# Patient Record
Sex: FEMALE | Race: WHITE | Employment: OTHER | URBAN - METROPOLITAN AREA
[De-identification: names, ages, dates, MRNs, and addresses within clinical notes are randomized per-mention and may not be internally consistent; named-entity substitution may affect disease eponyms.]

---

## 2017-03-22 DIAGNOSIS — E78.5 HYPERLIPIDEMIA, UNSPECIFIED HYPERLIPIDEMIA TYPE: ICD-10-CM

## 2017-03-22 LAB
CHOLESTEROL, TOTAL: NORMAL MG/DL
CHOLESTEROL/HDL RATIO: NORMAL
HDLC SERPL-MCNC: NORMAL MG/DL (ref 35–70)
LDL CHOLESTEROL CALCULATED: 90 MG/DL (ref 0–160)
TRIGL SERPL-MCNC: NORMAL MG/DL
VLDLC SERPL CALC-MCNC: NORMAL MG/DL

## 2017-05-25 ENCOUNTER — OFFICE VISIT (OUTPATIENT)
Dept: FAMILY MEDICINE CLINIC | Age: 72
End: 2017-05-25

## 2017-05-25 VITALS
DIASTOLIC BLOOD PRESSURE: 78 MMHG | HEIGHT: 66 IN | HEART RATE: 76 BPM | WEIGHT: 156 LBS | RESPIRATION RATE: 14 BRPM | SYSTOLIC BLOOD PRESSURE: 132 MMHG | BODY MASS INDEX: 25.07 KG/M2

## 2017-05-25 DIAGNOSIS — E78.5 HYPERLIPIDEMIA, UNSPECIFIED HYPERLIPIDEMIA TYPE: ICD-10-CM

## 2017-05-25 DIAGNOSIS — J30.2 SEASONAL ALLERGIC RHINITIS, UNSPECIFIED ALLERGIC RHINITIS TRIGGER: Primary | ICD-10-CM

## 2017-05-25 DIAGNOSIS — M25.542 ARTHRALGIA OF LEFT HAND: ICD-10-CM

## 2017-05-25 PROCEDURE — 99214 OFFICE O/P EST MOD 30 MIN: CPT | Performed by: EMERGENCY MEDICINE

## 2017-05-25 RX ORDER — ATORVASTATIN CALCIUM 20 MG/1
20 TABLET, FILM COATED ORAL DAILY
Qty: 90 TABLET | Refills: 3 | Status: SHIPPED | OUTPATIENT
Start: 2017-05-25 | End: 2018-09-19 | Stop reason: SDUPTHER

## 2017-05-25 ASSESSMENT — PATIENT HEALTH QUESTIONNAIRE - PHQ9
SUM OF ALL RESPONSES TO PHQ9 QUESTIONS 1 & 2: 0
SUM OF ALL RESPONSES TO PHQ9 QUESTIONS 1 & 2: 0
1. LITTLE INTEREST OR PLEASURE IN DOING THINGS: 0
SUM OF ALL RESPONSES TO PHQ QUESTIONS 1-9: 0
SUM OF ALL RESPONSES TO PHQ QUESTIONS 1-9: 0
2. FEELING DOWN, DEPRESSED OR HOPELESS: 0
1. LITTLE INTEREST OR PLEASURE IN DOING THINGS: 0
2. FEELING DOWN, DEPRESSED OR HOPELESS: 0

## 2017-05-25 ASSESSMENT — ENCOUNTER SYMPTOMS
WHEEZING: 0
RHINORRHEA: 1
ABDOMINAL PAIN: 0
BACK PAIN: 0
CHEST TIGHTNESS: 0
VOICE CHANGE: 0
DIARRHEA: 0
TROUBLE SWALLOWING: 0
VOMITING: 0
NAUSEA: 0
SHORTNESS OF BREATH: 0
SORE THROAT: 0
SINUS PRESSURE: 0
CONSTIPATION: 0
COUGH: 1

## 2017-11-28 ENCOUNTER — OFFICE VISIT (OUTPATIENT)
Dept: FAMILY MEDICINE CLINIC | Age: 72
End: 2017-11-28

## 2017-11-28 VITALS
DIASTOLIC BLOOD PRESSURE: 72 MMHG | HEART RATE: 66 BPM | HEIGHT: 66 IN | WEIGHT: 158.2 LBS | SYSTOLIC BLOOD PRESSURE: 134 MMHG | RESPIRATION RATE: 12 BRPM | BODY MASS INDEX: 25.43 KG/M2

## 2017-11-28 DIAGNOSIS — H02.539 LID LAG: ICD-10-CM

## 2017-11-28 DIAGNOSIS — M15.9 PRIMARY OSTEOARTHRITIS INVOLVING MULTIPLE JOINTS: ICD-10-CM

## 2017-11-28 DIAGNOSIS — E78.5 HYPERLIPIDEMIA, UNSPECIFIED HYPERLIPIDEMIA TYPE: Primary | ICD-10-CM

## 2017-11-28 PROCEDURE — 99213 OFFICE O/P EST LOW 20 MIN: CPT | Performed by: EMERGENCY MEDICINE

## 2017-11-28 ASSESSMENT — ENCOUNTER SYMPTOMS
SINUS PRESSURE: 0
CONSTIPATION: 0
TROUBLE SWALLOWING: 0
BACK PAIN: 0
DIARRHEA: 0
CHEST TIGHTNESS: 0
VOMITING: 0
RHINORRHEA: 0
VOICE CHANGE: 0
SORE THROAT: 0
NAUSEA: 0
SHORTNESS OF BREATH: 0
WHEEZING: 0
COUGH: 0
ABDOMINAL PAIN: 0

## 2017-11-28 NOTE — PROGRESS NOTES
Visit Date: 11/30/2017    Subjective:    Natan Veras is a 67 y.o. female who presents today for:  Chief Complaint   Patient presents with    Hyperlipidemia         HPI:     Left wrist pain, ankle, knees, finger and shoulder pain, Take Advil during the day and Aleve at night. Takes 3 Advil BID    Voltaren did not help much      Hyperlipidemia   This is a recurrent problem. The problem is controlled. Recent lipid tests were reviewed and are normal. She has no history of chronic renal disease, diabetes, hypothyroidism, liver disease, obesity or nephrotic syndrome. Pertinent negatives include no chest pain, leg pain, myalgias or shortness of breath. Current antihyperlipidemic treatment includes statins. There are no compliance problems. Current Home Medications:  Current Outpatient Prescriptions   Medication Sig Dispense Refill    Glucosamine-Chondroitin (GLUCOSAMINE CHONDR COMPLEX PO) Take by mouth      atorvastatin (LIPITOR) 20 MG tablet Take 1 tablet by mouth daily 90 tablet 3    ibuprofen (ADVIL;MOTRIN) 200 MG tablet Take 200 mg by mouth every morning      Multiple Vitamins-Minerals (THERAPEUTIC MULTIVITAMIN-MINERALS) tablet Take 1 tablet by mouth daily      cetirizine (ZYRTEC) 10 MG tablet Take 10 mg by mouth daily      naproxen sodium (ALEVE) 220 MG tablet Take 1 tablet by mouth 2 times daily (with meals) 60 tablet 3     No current facility-administered medications for this visit. Subjective:      Review of Systems   Constitutional: Negative for appetite change, chills, diaphoresis, fatigue and fever. HENT: Negative for congestion, ear pain, postnasal drip, rhinorrhea, sinus pressure, sneezing, sore throat, trouble swallowing and voice change. Respiratory: Negative for cough, chest tightness, shortness of breath and wheezing. Cardiovascular: Negative for chest pain, palpitations and leg swelling.    Gastrointestinal: Negative for abdominal pain, constipation, diarrhea, nausea and orders of the defined types were placed in this encounter. Orders Placed:    Discussed with the patient's regarding a referral to ophthalmologist for blepharoplasty because of lid lag however he is not interested yet at this time until next year. Encouraged the patient to have a flu shot    Return in about 6 months (around 5/28/2018) for Lipids. Discussed use, benefit, and side effects of prescribed medications. All patient questions answered. Pt voiced understanding. Instructed to continue current medications, diet and exercise. Patient agreed with treatment plan.

## 2017-12-05 ENCOUNTER — NURSE ONLY (OUTPATIENT)
Dept: FAMILY MEDICINE CLINIC | Age: 72
End: 2017-12-05

## 2017-12-05 DIAGNOSIS — Z23 IMMUNIZATION DUE: Primary | ICD-10-CM

## 2017-12-05 PROCEDURE — G0008 ADMIN INFLUENZA VIRUS VAC: HCPCS | Performed by: EMERGENCY MEDICINE

## 2018-05-29 ENCOUNTER — OFFICE VISIT (OUTPATIENT)
Dept: FAMILY MEDICINE CLINIC | Age: 73
End: 2018-05-29

## 2018-05-29 VITALS
WEIGHT: 156.8 LBS | RESPIRATION RATE: 14 BRPM | DIASTOLIC BLOOD PRESSURE: 88 MMHG | HEART RATE: 76 BPM | BODY MASS INDEX: 25.2 KG/M2 | SYSTOLIC BLOOD PRESSURE: 150 MMHG | HEIGHT: 66 IN

## 2018-05-29 DIAGNOSIS — E78.5 HYPERLIPIDEMIA, UNSPECIFIED HYPERLIPIDEMIA TYPE: ICD-10-CM

## 2018-05-29 DIAGNOSIS — G56.03 BILATERAL CARPAL TUNNEL SYNDROME: ICD-10-CM

## 2018-05-29 DIAGNOSIS — I10 ESSENTIAL HYPERTENSION: Primary | ICD-10-CM

## 2018-05-29 PROCEDURE — 99214 OFFICE O/P EST MOD 30 MIN: CPT | Performed by: EMERGENCY MEDICINE

## 2018-05-29 ASSESSMENT — ENCOUNTER SYMPTOMS
SHORTNESS OF BREATH: 0
TROUBLE SWALLOWING: 0
SINUS PRESSURE: 0
CHEST TIGHTNESS: 0
DIARRHEA: 0
VOMITING: 0
COUGH: 0
ABDOMINAL PAIN: 0
RHINORRHEA: 0
CONSTIPATION: 0
VOICE CHANGE: 0
WHEEZING: 0
NAUSEA: 0
BACK PAIN: 0
SORE THROAT: 0

## 2018-05-29 ASSESSMENT — PATIENT HEALTH QUESTIONNAIRE - PHQ9
SUM OF ALL RESPONSES TO PHQ QUESTIONS 1-9: 0
SUM OF ALL RESPONSES TO PHQ9 QUESTIONS 1 & 2: 0
1. LITTLE INTEREST OR PLEASURE IN DOING THINGS: 0
2. FEELING DOWN, DEPRESSED OR HOPELESS: 0

## 2018-06-19 ENCOUNTER — TELEPHONE (OUTPATIENT)
Dept: FAMILY MEDICINE CLINIC | Age: 73
End: 2018-06-19

## 2018-09-19 RX ORDER — ATORVASTATIN CALCIUM 20 MG/1
TABLET, FILM COATED ORAL
Qty: 90 TABLET | Refills: 3 | Status: SHIPPED | OUTPATIENT
Start: 2018-09-19 | End: 2019-08-06 | Stop reason: SDUPTHER

## 2018-09-26 DIAGNOSIS — E78.5 HYPERLIPIDEMIA, UNSPECIFIED HYPERLIPIDEMIA TYPE: ICD-10-CM

## 2018-09-26 DIAGNOSIS — I10 ESSENTIAL HYPERTENSION: ICD-10-CM

## 2018-09-26 LAB
CHOLESTEROL, TOTAL: NORMAL MG/DL
CHOLESTEROL/HDL RATIO: NORMAL
HDLC SERPL-MCNC: NORMAL MG/DL (ref 35–70)
LDL CHOLESTEROL CALCULATED: 89 MG/DL (ref 0–160)
TRIGL SERPL-MCNC: NORMAL MG/DL
VLDLC SERPL CALC-MCNC: NORMAL MG/DL

## 2018-10-02 ENCOUNTER — OFFICE VISIT (OUTPATIENT)
Dept: FAMILY MEDICINE CLINIC | Age: 73
End: 2018-10-02

## 2018-10-02 VITALS
HEART RATE: 64 BPM | DIASTOLIC BLOOD PRESSURE: 80 MMHG | WEIGHT: 161.5 LBS | RESPIRATION RATE: 14 BRPM | HEIGHT: 66 IN | SYSTOLIC BLOOD PRESSURE: 148 MMHG | BODY MASS INDEX: 25.96 KG/M2

## 2018-10-02 DIAGNOSIS — H02.539 LID LAG: ICD-10-CM

## 2018-10-02 DIAGNOSIS — I10 ESSENTIAL HYPERTENSION: Primary | ICD-10-CM

## 2018-10-02 DIAGNOSIS — E78.5 HYPERLIPIDEMIA, UNSPECIFIED HYPERLIPIDEMIA TYPE: ICD-10-CM

## 2018-10-02 DIAGNOSIS — Z23 IMMUNIZATION DUE: ICD-10-CM

## 2018-10-02 PROCEDURE — 90688 IIV4 VACCINE SPLT 0.5 ML IM: CPT | Performed by: EMERGENCY MEDICINE

## 2018-10-02 PROCEDURE — 99213 OFFICE O/P EST LOW 20 MIN: CPT | Performed by: EMERGENCY MEDICINE

## 2018-10-02 PROCEDURE — G0008 ADMIN INFLUENZA VIRUS VAC: HCPCS | Performed by: EMERGENCY MEDICINE

## 2018-10-02 ASSESSMENT — ENCOUNTER SYMPTOMS
DIARRHEA: 0
TROUBLE SWALLOWING: 0
VOICE CHANGE: 0
COUGH: 0
WHEEZING: 0
CONSTIPATION: 0
CHEST TIGHTNESS: 0
SINUS PRESSURE: 0
RHINORRHEA: 0
SHORTNESS OF BREATH: 0
BACK PAIN: 0
SORE THROAT: 0
ABDOMINAL PAIN: 0
VOMITING: 0
NAUSEA: 0

## 2018-10-02 ASSESSMENT — PATIENT HEALTH QUESTIONNAIRE - PHQ9
2. FEELING DOWN, DEPRESSED OR HOPELESS: 0
SUM OF ALL RESPONSES TO PHQ9 QUESTIONS 1 & 2: 0
SUM OF ALL RESPONSES TO PHQ QUESTIONS 1-9: 0
1. LITTLE INTEREST OR PLEASURE IN DOING THINGS: 0
SUM OF ALL RESPONSES TO PHQ QUESTIONS 1-9: 0

## 2018-10-02 NOTE — PROGRESS NOTES
Visit Date: 10/2/2018    Subjective:    Abbie Jiménez is a 68 y.o. female who presents today for:  Chief Complaint   Patient presents with    Hypertension    Hyperlipidemia         HPI:       Arthritis pain on the fingers , had CTS right surgery    Patient been checking her BP at home and were 118-124       Hypertension   This is a chronic problem. The problem has been waxing and waning since onset. Pertinent negatives include no chest pain, headaches, neck pain, palpitations, peripheral edema or shortness of breath. Hyperlipidemia   This is a chronic problem. The problem is controlled. Recent lipid tests were reviewed and are normal. She has no history of liver disease. Pertinent negatives include no chest pain, leg pain, myalgias or shortness of breath. Current antihyperlipidemic treatment includes statins. There are no compliance problems. Current Home Medications:  Current Outpatient Prescriptions   Medication Sig Dispense Refill    zoster recombinant adjuvanted vaccine (SHINGRIX) 50 MCG SUSR injection Inject 0.5 mLs into the muscle once for 1 dose 0.5 mL 0    atorvastatin (LIPITOR) 20 MG tablet TAKE 1 TABLET DAILY 90 tablet 3    Glucosamine-Chondroitin (GLUCOSAMINE CHONDR COMPLEX PO) Take by mouth      ibuprofen (ADVIL;MOTRIN) 200 MG tablet Take 200 mg by mouth every morning      cetirizine (ZYRTEC) 10 MG tablet Take 10 mg by mouth daily      naproxen sodium (ALEVE) 220 MG tablet Take 1 tablet by mouth 2 times daily (with meals) 60 tablet 3    diclofenac (VOLTAREN) 50 MG EC tablet Take 1 tablet by mouth 2 times daily as needed for Pain (arthritis) 60 tablet 2     No current facility-administered medications for this visit. Subjective:      Review of Systems   Constitutional: Negative for appetite change, chills, diaphoresis, fatigue and fever.    HENT: Negative for congestion, ear pain, postnasal drip, rhinorrhea, sinus pressure, sneezing, sore throat, trouble swallowing and voice change. Respiratory: Negative for cough, chest tightness, shortness of breath and wheezing. Cardiovascular: Negative for chest pain, palpitations and leg swelling. Gastrointestinal: Negative for abdominal pain, constipation, diarrhea, nausea and vomiting. Musculoskeletal: Negative for arthralgias, back pain, joint swelling, myalgias, neck pain and neck stiffness. Neurological: Negative for dizziness, syncope, weakness, light-headedness, numbness and headaches. Objective:     BP (!) 148/80   Pulse 64   Resp 14   Ht 5' 6\" (1.676 m)   Wt 161 lb 8 oz (73.3 kg)   Breastfeeding? No   BMI 26.07 kg/m²   BP Readings from Last 3 Encounters:   10/02/18 (!) 148/80   05/29/18 (!) 150/88   11/28/17 134/72     Wt Readings from Last 3 Encounters:   10/02/18 161 lb 8 oz (73.3 kg)   05/29/18 156 lb 12.8 oz (71.1 kg)   11/28/17 158 lb 3.2 oz (71.8 kg)       Physical Exam   Constitutional: She is oriented to person, place, and time. She appears well-developed and well-nourished. She is cooperative. HENT:   Head: Normocephalic and atraumatic. Right Ear: External ear normal.   Left Ear: External ear normal.   Nose: Nose normal.   Mouth/Throat: Oropharynx is clear and moist.   Eyes: Pupils are equal, round, and reactive to light. Conjunctivae and EOM are normal. No scleral icterus. + lid lag bilateral   Neck: Normal range of motion. Neck supple. No JVD present. No thyromegaly present. Cardiovascular: Normal rate, regular rhythm and intact distal pulses. Exam reveals no friction rub. No murmur heard. Pulmonary/Chest: Effort normal and breath sounds normal. She has no wheezes. She has no rales. She exhibits no tenderness. Abdominal: Soft. Bowel sounds are normal. She exhibits no mass. There is no tenderness. Musculoskeletal: She exhibits no edema. Lymphadenopathy:     She has no cervical adenopathy. Neurological: She is alert and oriented to person, place, and time. Skin: No rash noted.

## 2018-10-02 NOTE — PROGRESS NOTES
Immunizations     Name Date Dose Route    Influenza, Havenathanael Yohannes, 6 mo and older, IM (Flulaval) 10/2/2018 0.5 mL Intramuscular    Site: Deltoid- Left    Lot: [id number]    NDC: 84876-245-93

## 2019-04-02 ENCOUNTER — OFFICE VISIT (OUTPATIENT)
Dept: FAMILY MEDICINE CLINIC | Age: 74
End: 2019-04-02

## 2019-04-02 VITALS
HEART RATE: 66 BPM | WEIGHT: 164 LBS | RESPIRATION RATE: 14 BRPM | HEIGHT: 66 IN | SYSTOLIC BLOOD PRESSURE: 138 MMHG | BODY MASS INDEX: 26.36 KG/M2 | DIASTOLIC BLOOD PRESSURE: 82 MMHG

## 2019-04-02 DIAGNOSIS — H02.539 LID LAG: Primary | ICD-10-CM

## 2019-04-02 PROCEDURE — 99213 OFFICE O/P EST LOW 20 MIN: CPT | Performed by: EMERGENCY MEDICINE

## 2019-04-02 ASSESSMENT — ENCOUNTER SYMPTOMS
BACK PAIN: 0
DIARRHEA: 0
SORE THROAT: 0
CONSTIPATION: 0
VOICE CHANGE: 0
TROUBLE SWALLOWING: 0
WHEEZING: 0
COUGH: 0
SINUS PRESSURE: 0
VOMITING: 0
RHINORRHEA: 0
SHORTNESS OF BREATH: 0
CHEST TIGHTNESS: 0
NAUSEA: 0
ABDOMINAL PAIN: 0

## 2019-04-02 ASSESSMENT — PATIENT HEALTH QUESTIONNAIRE - PHQ9
2. FEELING DOWN, DEPRESSED OR HOPELESS: 0
SUM OF ALL RESPONSES TO PHQ QUESTIONS 1-9: 0
1. LITTLE INTEREST OR PLEASURE IN DOING THINGS: 0
SUM OF ALL RESPONSES TO PHQ QUESTIONS 1-9: 0
SUM OF ALL RESPONSES TO PHQ9 QUESTIONS 1 & 2: 0

## 2019-04-02 NOTE — PROGRESS NOTES
Visit Date: 4/2/2019    Subjective:    Kyle Claros is a 68 y. o.female who presents today for:  Chief Complaint   Patient presents with    Hyperlipidemia    Hypertension         HPI:       Arthritis pain on the fingers , tingly on the right neck    Patient been checking her BP at home and were 118-124     Want eyelid done    CTS left still more the right      Hyperlipidemia   This is a chronic problem. The problem is controlled. Recent lipid tests were reviewed and are normal. She has no history of liver disease. Pertinent negatives include no chest pain, leg pain, myalgias or shortness of breath. Current antihyperlipidemic treatment includes statins. There are no compliance problems. Hypertension   This is a chronic problem. The problem has been waxing and waning since onset. Pertinent negatives include no chest pain, headaches, neck pain, palpitations, peripheral edema or shortness of breath. CurrentHome Medications:  Current Outpatient Medications   Medication Sig Dispense Refill    atorvastatin (LIPITOR) 20 MG tablet TAKE 1 TABLET DAILY 90 tablet 3    Glucosamine-Chondroitin (GLUCOSAMINE CHONDR COMPLEX PO) Take by mouth      ibuprofen (ADVIL;MOTRIN) 200 MG tablet Take 200 mg by mouth every morning      cetirizine (ZYRTEC) 10 MG tablet Take 10 mg by mouth daily      naproxen sodium (ALEVE) 220 MG tablet Take 1 tablet by mouth 2 times daily (with meals) 60 tablet 3     No current facility-administered medications for this visit. Subjective:      Review of Systems   Constitutional: Negative for appetite change, chills, diaphoresis, fatigue and fever. HENT: Negative for congestion, ear pain, postnasal drip, rhinorrhea, sinus pressure, sneezing, sore throat, trouble swallowing and voice change. Respiratory: Negative for cough, chest tightness, shortness of breath and wheezing. Cardiovascular: Negative for chest pain, palpitations and leg swelling.    Gastrointestinal: Negative for abdominal pain, constipation, diarrhea, nausea and vomiting. Musculoskeletal: Negative for arthralgias, back pain, joint swelling, myalgias, neck pain and neck stiffness. CTS L>R   Neurological: Negative for dizziness, syncope, weakness, light-headedness, numbness and headaches. Objective:     /82   Pulse 66   Resp 14   Ht 5' 6\" (1.676 m)   Wt 164 lb (74.4 kg)   Breastfeeding? No   BMI 26.47 kg/m²   BP Readings from Last 3 Encounters:   04/02/19 138/82   10/02/18 (!) 148/80   05/29/18 (!) 150/88     Wt Readings from Last 3 Encounters:   04/02/19 164 lb (74.4 kg)   10/02/18 161 lb 8 oz (73.3 kg)   05/29/18 156 lb 12.8 oz (71.1 kg)       Physical Exam   Constitutional: She is oriented to person, place, and time. She appears well-developed and well-nourished. She is cooperative. HENT:   Head: Normocephalic and atraumatic. Right Ear: External ear normal.   Left Ear: External ear normal.   Nose: Nose normal.   Mouth/Throat: Oropharynx is clear and moist.   Eyes: Pupils are equal, round, and reactive to light. Conjunctivae and EOM are normal. No scleral icterus. + lid lag bilateral   Neck: Normal range of motion. Neck supple. No JVD present. No thyromegaly present. Cardiovascular: Normal rate, regular rhythm and intact distal pulses. Exam reveals no friction rub. No murmur heard. Pulmonary/Chest: Effort normal and breath sounds normal. She has no wheezes. She has no rales. She exhibits no tenderness. Abdominal: Soft. Bowel sounds are normal. She exhibits no mass. There is no tenderness. Musculoskeletal: She exhibits no edema. Lymphadenopathy:     She has no cervical adenopathy. Neurological: She is alert and oriented to person, place, and time. Skin: No rash noted. Vitals reviewed. Assessment:         Diagnosis Orders   1.  Lid lag  External Referral To Ophthalmology       Plan:      Medications Prescribed:  No orders of the defined types were placed in this

## 2019-08-06 ENCOUNTER — OFFICE VISIT (OUTPATIENT)
Dept: FAMILY MEDICINE CLINIC | Age: 74
End: 2019-08-06

## 2019-08-06 VITALS
HEIGHT: 66 IN | DIASTOLIC BLOOD PRESSURE: 72 MMHG | RESPIRATION RATE: 14 BRPM | HEART RATE: 64 BPM | WEIGHT: 157.2 LBS | BODY MASS INDEX: 25.26 KG/M2 | SYSTOLIC BLOOD PRESSURE: 128 MMHG

## 2019-08-06 DIAGNOSIS — I10 ESSENTIAL HYPERTENSION: Primary | ICD-10-CM

## 2019-08-06 DIAGNOSIS — H02.539 LID LAG: ICD-10-CM

## 2019-08-06 DIAGNOSIS — Z78.0 POST-MENOPAUSAL: ICD-10-CM

## 2019-08-06 DIAGNOSIS — E78.5 HYPERLIPIDEMIA, UNSPECIFIED HYPERLIPIDEMIA TYPE: ICD-10-CM

## 2019-08-06 DIAGNOSIS — M15.9 PRIMARY OSTEOARTHRITIS INVOLVING MULTIPLE JOINTS: ICD-10-CM

## 2019-08-06 PROCEDURE — 99213 OFFICE O/P EST LOW 20 MIN: CPT | Performed by: EMERGENCY MEDICINE

## 2019-08-06 RX ORDER — ATORVASTATIN CALCIUM 20 MG/1
20 TABLET, FILM COATED ORAL DAILY
Qty: 90 TABLET | Refills: 1 | Status: SHIPPED | OUTPATIENT
Start: 2019-08-06 | End: 2022-05-10

## 2019-08-06 ASSESSMENT — PATIENT HEALTH QUESTIONNAIRE - PHQ9
1. LITTLE INTEREST OR PLEASURE IN DOING THINGS: 0
2. FEELING DOWN, DEPRESSED OR HOPELESS: 0
SUM OF ALL RESPONSES TO PHQ QUESTIONS 1-9: 0
SUM OF ALL RESPONSES TO PHQ QUESTIONS 1-9: 0
SUM OF ALL RESPONSES TO PHQ9 QUESTIONS 1 & 2: 0

## 2019-08-06 ASSESSMENT — ENCOUNTER SYMPTOMS
SINUS PRESSURE: 0
RHINORRHEA: 0
SHORTNESS OF BREATH: 0
ABDOMINAL PAIN: 0
WHEEZING: 0
BACK PAIN: 0
CONSTIPATION: 0
CHEST TIGHTNESS: 0
VOMITING: 0
NAUSEA: 0
COUGH: 0
VOICE CHANGE: 0
SORE THROAT: 0
DIARRHEA: 0
TROUBLE SWALLOWING: 0

## 2019-08-06 NOTE — PROGRESS NOTES
Panel   3. Lid lag     4. Primary osteoarthritis involving multiple joints     5. Post-menopausal  DEXA Bone Density 2 Sites       Plan:      Medications Prescribed:  Orders Placed This Encounter   Medications    atorvastatin (LIPITOR) 20 MG tablet     Sig: Take 1 tablet by mouth daily     Dispense:  90 tablet     Refill:  1     Orders Placed:  Orders Placed This Encounter   Procedures    DEXA Bone Density 2 Sites     Standing Status:   Future     Standing Expiration Date:   8/6/2020    Comprehensive Metabolic Panel     Standing Status:   Future     Standing Expiration Date:   8/5/2020    Lipid Panel     Standing Status:   Future     Standing Expiration Date:   8/5/2020     Order Specific Question:   Is Patient Fasting?/# of Hours     Answer:   YES 12 HOURS        Return in about 6 months (around 2/6/2020) for HPL. Discussed use, benefit, and side effects of prescribedmedications. All patient questions answered. Pt voiced understanding. Instructedto continue current medications, diet and exercise. Patient agreed with treatmentplan.

## 2019-08-16 LAB
CHOLESTEROL, TOTAL: NORMAL MG/DL
CHOLESTEROL/HDL RATIO: NORMAL
HDLC SERPL-MCNC: NORMAL MG/DL (ref 35–70)
LDL CHOLESTEROL CALCULATED: 91 MG/DL (ref 0–160)
TRIGL SERPL-MCNC: NORMAL MG/DL
VLDLC SERPL CALC-MCNC: NORMAL MG/DL

## 2019-08-19 DIAGNOSIS — E78.5 HYPERLIPIDEMIA, UNSPECIFIED HYPERLIPIDEMIA TYPE: ICD-10-CM

## 2019-08-19 DIAGNOSIS — I10 ESSENTIAL HYPERTENSION: ICD-10-CM

## 2019-10-18 ENCOUNTER — NURSE ONLY (OUTPATIENT)
Dept: FAMILY MEDICINE CLINIC | Age: 74
End: 2019-10-18

## 2019-10-18 DIAGNOSIS — Z23 NEED FOR INFLUENZA VACCINATION: Primary | ICD-10-CM

## 2019-10-18 PROCEDURE — 90688 IIV4 VACCINE SPLT 0.5 ML IM: CPT | Performed by: FAMILY MEDICINE

## 2019-10-18 PROCEDURE — G0008 ADMIN INFLUENZA VIRUS VAC: HCPCS | Performed by: FAMILY MEDICINE

## 2019-11-13 ENCOUNTER — TELEPHONE (OUTPATIENT)
Dept: FAMILY MEDICINE CLINIC | Age: 74
End: 2019-11-13

## 2022-05-10 ENCOUNTER — OFFICE VISIT (OUTPATIENT)
Dept: FAMILY MEDICINE CLINIC | Age: 77
End: 2022-05-10
Payer: MEDICARE

## 2022-05-10 VITALS
OXYGEN SATURATION: 97 % | RESPIRATION RATE: 20 BRPM | HEIGHT: 66 IN | BODY MASS INDEX: 24.83 KG/M2 | SYSTOLIC BLOOD PRESSURE: 138 MMHG | DIASTOLIC BLOOD PRESSURE: 76 MMHG | WEIGHT: 154.5 LBS | TEMPERATURE: 97.9 F | HEART RATE: 78 BPM

## 2022-05-10 DIAGNOSIS — Z11.59 ENCOUNTER FOR HEPATITIS C SCREENING TEST FOR LOW RISK PATIENT: ICD-10-CM

## 2022-05-10 DIAGNOSIS — Z12.11 SCREEN FOR COLON CANCER: ICD-10-CM

## 2022-05-10 DIAGNOSIS — I10 ESSENTIAL HYPERTENSION: ICD-10-CM

## 2022-05-10 DIAGNOSIS — Z87.891 PERSONAL HISTORY OF TOBACCO USE: Primary | ICD-10-CM

## 2022-05-10 DIAGNOSIS — Z23 NEED FOR SHINGLES VACCINE: ICD-10-CM

## 2022-05-10 DIAGNOSIS — Z12.31 SCREENING MAMMOGRAM FOR BREAST CANCER: ICD-10-CM

## 2022-05-10 DIAGNOSIS — E78.5 HYPERLIPIDEMIA, UNSPECIFIED HYPERLIPIDEMIA TYPE: ICD-10-CM

## 2022-05-10 DIAGNOSIS — Z23 NEED FOR VACCINATION FOR STREP PNEUMONIAE: ICD-10-CM

## 2022-05-10 DIAGNOSIS — M25.50 ARTHRALGIA, UNSPECIFIED JOINT: ICD-10-CM

## 2022-05-10 DIAGNOSIS — M85.89 OSTEOPENIA OF MULTIPLE SITES: ICD-10-CM

## 2022-05-10 PROCEDURE — G0009 ADMIN PNEUMOCOCCAL VACCINE: HCPCS | Performed by: NURSE PRACTITIONER

## 2022-05-10 PROCEDURE — 90732 PPSV23 VACC 2 YRS+ SUBQ/IM: CPT | Performed by: NURSE PRACTITIONER

## 2022-05-10 PROCEDURE — G0296 VISIT TO DETERM LDCT ELIG: HCPCS | Performed by: NURSE PRACTITIONER

## 2022-05-10 PROCEDURE — 99204 OFFICE O/P NEW MOD 45 MIN: CPT | Performed by: NURSE PRACTITIONER

## 2022-05-10 RX ORDER — ACETAMINOPHEN 500 MG
500 TABLET ORAL 2 TIMES DAILY
COMMUNITY

## 2022-05-10 RX ORDER — MAGNESIUM OXIDE 400 MG/1
400 TABLET ORAL DAILY
COMMUNITY

## 2022-05-10 RX ORDER — ZOSTER VACCINE RECOMBINANT, ADJUVANTED 50 MCG/0.5
0.5 KIT INTRAMUSCULAR SEE ADMIN INSTRUCTIONS
Qty: 0.5 ML | Refills: 0 | Status: SHIPPED | OUTPATIENT
Start: 2022-05-10 | End: 2022-11-06

## 2022-05-10 RX ORDER — MULTIVITAMIN WITH IRON
1 TABLET ORAL DAILY
COMMUNITY

## 2022-05-10 RX ORDER — CALCIUM CARBONATE 500(1250)
500 TABLET ORAL DAILY
COMMUNITY

## 2022-05-10 ASSESSMENT — ENCOUNTER SYMPTOMS
EYE REDNESS: 0
SORE THROAT: 0
RHINORRHEA: 0
ANAL BLEEDING: 0
NAUSEA: 0
EYE DISCHARGE: 0
DIARRHEA: 0
COLOR CHANGE: 0
ABDOMINAL DISTENTION: 0
ABDOMINAL PAIN: 0
SHORTNESS OF BREATH: 0
CONSTIPATION: 0
COUGH: 0
BLOOD IN STOOL: 0

## 2022-05-10 NOTE — PROGRESS NOTES
Immunizations Administered     Name Date Dose Route    Pneumococcal Polysaccharide ([id number]) 5/10/2022 0.5 mL Intramuscular    Site: Deltoid- Left    Lot: [id number]    NDC: 9617-8326-40        Patient tolerated well

## 2022-05-10 NOTE — PROGRESS NOTES
[address] [facility]  [phone] (phone)  [phone] (fax)    Visit Date: 5/10/2022    Jesus Judd is a 68 y.o. female who presents today for:  Chief Complaint   Patient presents with    New Patient     establishing care, arthritic pain, allergies     HPI:     New patient: was seeing Dr. Robi Case - last visit 2019    Specialist: None    HM: Had a dexa scan done 2019 - osteopenia, mammogram - 2019, last colonoscopy was 2008 - repeat 5 years (did not go back), negative Cologuard 2018    Has issues with joint pain \"all over\" - never done physical therapy - has pain in hands, fingers, wrist, shoulders, legs, feet    Did get 3 COVID shots    No longer a smoker - quit about 5 years ago - 3/4 pack daily - started at age 23. Has issues with allergies - started Sunday afternoon - raspy throat, ear \"tingling\", eyes are irritated. Taking Zyrtec and nasal saline    Fatigue - feels like she could fall asleep at any time - never had a sleep study - thinks she snores - does not think she would do a sleep study. The patient has no complaints today. Patient eats 2 meals per day and 1 snacks per day. She does exercise regularly:   Physical activities include: very active,  She does take over the counter vitamins or supplements. The patient has ever had a blood transfusion or tattooed?: no. She wears seatbelts while riding a car. She does not text or talk on the phone while driving. She performs all of her ADL's without problem. She is independent, she cooks, drives, bathes, and gets dressed without assistance. She is . She has 3 children. 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 She is not current on pneumococcal. She is not a smoker. - quit about 5 years ago. Patient does not consume alcoholic beverages on a regular basis. She is not sexually active. She has had 1 partner(s) in the last 52 years.        Dentist: last exam was 3/2022 - abnormal -     Eye: 2 months ago - no new rx -     She  reports that she quit smoking about 9 years ago. Her smoking use included cigarettes. She has a 40.00 pack-year smoking history. She has never used smokeless tobacco. She reports that she does not drink alcohol and does not use drugs. . Her last pap smear was 7  years and it was normal. She has had a Bone Density - 2019 - osteopenia     HPI  Health Maintenance   Topic Date Due    Annual Wellness Visit (AWV)  Never done    Depression Screen  Never done    Hepatitis C screen  Never done    Shingles vaccine (1 of 2) Never done    Low dose CT lung screening  Never done    Potassium  2020    Creatinine  2020    DTaP/Tdap/Td vaccine (2 - Td or Tdap) 10/16/2022    DEXA (modify frequency per FRAX score)  Completed    Flu vaccine  Completed    Pneumococcal 65+ years Vaccine  Completed    COVID-19 Vaccine  Completed    Hepatitis A vaccine  Aged Out    Hepatitis B vaccine  Aged Out    Hib vaccine  Aged Out    Meningococcal (ACWY) vaccine  Aged Out     Past Medical History:   Diagnosis Date    Bipolar 1 disorder, depressed, full remission ([facility] [address]     Degenerative arthritis of lumbar spine     Essential hypertension 2018    Hyperglycemia     hx of    Hyperlipidemia     Migraine     Osteopenia     Smoker     Thyromegaly     hx of      Past Surgical History:   Procedure Laterality Date    CARPAL TUNNEL RELEASE Right 2018    COLONOSCOPY      CYST REMOVAL      head    DILATION AND CURETTAGE OF UTERUS      TOOTH EXTRACTION N/A 2019     Family History   Problem Relation Age of Onset    Thyroid Disease Mother     High Cholesterol Mother     Stroke Father     High Blood Pressure Brother     Stroke Brother      Social History     Tobacco Use    Smoking status: Former Smoker     Packs/day: 1.00     Years: 40.00     Pack years: 40.00     Types: Cigarettes     Quit date:      Years since quittin.3    Smokeless tobacco: Never Used   Substance Use Topics    Alcohol use: No      Current Outpatient Medications   Medication Sig Dispense Refill    Multiple Vitamins-Minerals (HAIR SKIN AND NAILS FORMULA) TABS Take 1 tablet by mouth daily      Misc Natural Products (JOINT HEALTH) CAPS Take 1 tablet by mouth daily      acetaminophen (TYLENOL) 500 MG tablet Take 500 mg by mouth in the morning and at bedtime      Turmeric (QC TUMERIC COMPLEX PO) Take 1 tablet by mouth daily      B-Complex-C TABS Take 1 tablet by mouth daily      magnesium oxide (MAG-OX) 400 MG tablet Take 400 mg by mouth daily      calcium carbonate (OSCAL) 500 MG TABS tablet Take 500 mg by mouth daily      zoster recombinant adjuvanted vaccine (SHINGRIX) 50 MCG/0.5ML SUSR injection Inject 0.5 mLs into the muscle See Admin Instructions 1 dose now and repeat in 2-6 months 0.5 mL 0    cetirizine (ZYRTEC) 10 MG tablet Take 10 mg by mouth daily      Glucosamine-Chondroitin (GLUCOSAMINE CHONDR COMPLEX PO) Take by mouth (Patient not taking: Reported on 5/10/2022)      ibuprofen (ADVIL;MOTRIN) 200 MG tablet Take 200 mg by mouth every morning (Patient not taking: Reported on 5/10/2022)      naproxen sodium (ALEVE) 220 MG tablet Take 1 tablet by mouth 2 times daily (with meals) (Patient not taking: Reported on 5/10/2022) 60 tablet 3     No current facility-administered medications for this visit. Allergies   Allergen Reactions    Sulfa Antibiotics        Subjective:    Review of Systems   Constitutional: Negative for chills, fatigue and fever. HENT: Negative for congestion, ear pain, postnasal drip, rhinorrhea and sore throat. Eyes: Negative for discharge and redness. Respiratory: Negative for cough and shortness of breath. Cardiovascular: Negative for chest pain and leg swelling. Gastrointestinal: Negative for abdominal distention, abdominal pain, anal bleeding, blood in stool, constipation, diarrhea and nausea.    Musculoskeletal: Positive for arthralgias and myalgias. Skin: Negative for color change and rash. Allergic/Immunologic: Positive for environmental allergies. Neurological: Negative for facial asymmetry, speech difficulty and weakness. Hematological: Does not bruise/bleed easily. Psychiatric/Behavioral: Negative for agitation and confusion. Objective:     Vitals:    05/10/22 1332   BP: 138/76   Site: Left Upper Arm   Position: Sitting   Cuff Size: Medium Adult   Pulse: 78   Resp: 20   Temp: 97.9 °F (36.6 °C)   TempSrc: Skin   SpO2: 97%   Weight: 154 lb 8 oz (70.1 kg)   Height: 5' 5.5\" (1.664 m)       Body mass index is 25.32 kg/m². Wt Readings from Last 3 Encounters:   05/10/22 154 lb 8 oz (70.1 kg)   08/06/19 157 lb 3.2 oz (71.3 kg)   04/02/19 164 lb (74.4 kg)     BP Readings from Last 3 Encounters:   05/10/22 138/76   08/06/19 128/72   04/02/19 138/82     Physical Exam  Constitutional:       General: She is not in acute distress. Appearance: She is well-developed. She is not diaphoretic. HENT:      Head: Normocephalic and atraumatic. Right Ear: Hearing and external ear normal. No swelling. Left Ear: Hearing and external ear normal. No swelling. Nose: No mucosal edema or rhinorrhea. Right Sinus: No maxillary sinus tenderness or frontal sinus tenderness. Left Sinus: No maxillary sinus tenderness or frontal sinus tenderness. Mouth/Throat:      Pharynx: No oropharyngeal exudate or posterior oropharyngeal erythema. Eyes:      General:         Right eye: No discharge. Left eye: No discharge. Conjunctiva/sclera: Conjunctivae normal.      Pupils: Pupils are equal, round, and reactive to light. Cardiovascular:      Comments: No lower extremity edema  Pulmonary:      Effort: Pulmonary effort is normal. No respiratory distress. Breath sounds: Normal breath sounds. Musculoskeletal:         General: No tenderness or deformity. Cervical back: Normal range of motion. Comments: Full ROM of upper and lower extremities    Lymphadenopathy:      Cervical: No cervical adenopathy. Skin:     General: Skin is warm and dry. Findings: No rash. Nails: There is no clubbing. Neurological:      Mental Status: She is alert and oriented to person, place, and time. Coordination: Coordination normal.      Gait: Gait normal.   Psychiatric:         Speech: Speech normal.         Behavior: Behavior normal.         Thought Content: Thought content normal.         Judgment: Judgment normal.         Lab Results   Component Value Date    LDLCALC 91 08/16/2019     Assessment:       Diagnosis Orders   1. Personal history of tobacco use  CBC with Auto Differential    Comprehensive Metabolic Panel    Hepatic Function Panel    Lipid Panel    Sedimentation Rate    Rheumatoid Factor    TSH with Reflex    Vitamin D 25 Hydroxy    Magnesium    CT VISIT TO DISCUSS LUNG CA SCREEN W LDCT    CT Lung Screen (Annual)   2. Screening mammogram for breast cancer  NARCISO DIGITAL SCREEN W OR WO CAD BILATERAL   3. Osteopenia of multiple sites  CBC with Auto Differential    Comprehensive Metabolic Panel    Hepatic Function Panel    Lipid Panel    Sedimentation Rate    Rheumatoid Factor    TSH with Reflex    Vitamin D 25 Hydroxy    Magnesium    MAMMO DEXA BONE DENSITY SCAN   4. Hyperlipidemia, unspecified hyperlipidemia type  CBC with Auto Differential    Comprehensive Metabolic Panel    Hepatic Function Panel    Lipid Panel    Sedimentation Rate    Rheumatoid Factor    TSH with Reflex    Vitamin D 25 Hydroxy    Magnesium   5. Essential hypertension  CBC with Auto Differential    Comprehensive Metabolic Panel    Hepatic Function Panel    Lipid Panel    Sedimentation Rate    Rheumatoid Factor    TSH with Reflex    Vitamin D 25 Hydroxy    Magnesium   6. Screen for colon cancer  Fecal DNA Colorectal cancer screening (Cologuard)   7.  Need for shingles vaccine  zoster recombinant adjuvanted vaccine [facility]) 50 MCG/0.5ML SUSR injection   8. Encounter for hepatitis C screening test for low risk patient  Hepatitis C Antibody   9. Arthralgia, unspecified joint  Cheryl Marie MD, Pain Medicine, [facility]   10. Need for vaccination for Strep pneumoniae  Pneumococcal polysaccharide vaccine 23-valent greater than or equal to 1yo subcutaneous/IM       Plan:   Apple Kramer was seen today for new patient. Diagnoses and all orders for this visit:    Personal history of tobacco use  -     CBC with Auto Differential; Future  -     Comprehensive Metabolic Panel; Future  -     Hepatic Function Panel; Future  -     Lipid Panel; Future  -     Sedimentation Rate; Future  -     Rheumatoid Factor; Future  -     TSH with Reflex; Future  -     Vitamin D 25 Hydroxy; Future  -     Magnesium; Future  -     UT VISIT TO DISCUSS LUNG CA SCREEN W LDCT  -     CT Lung Screen (Annual); Future    Screening mammogram for breast cancer  -     NARCISO DIGITAL SCREEN W OR WO CAD BILATERAL; Future    Osteopenia of multiple sites  -     CBC with Auto Differential; Future  -     Comprehensive Metabolic Panel; Future  -     Hepatic Function Panel; Future  -     Lipid Panel; Future  -     Sedimentation Rate; Future  -     Rheumatoid Factor; Future  -     TSH with Reflex; Future  -     Vitamin D 25 Hydroxy; Future  -     Magnesium; Future  -     MAMMO DEXA BONE DENSITY SCAN; Future    Hyperlipidemia, unspecified hyperlipidemia type  -     CBC with Auto Differential; Future  -     Comprehensive Metabolic Panel; Future  -     Hepatic Function Panel; Future  -     Lipid Panel; Future  -     Sedimentation Rate; Future  -     Rheumatoid Factor; Future  -     TSH with Reflex; Future  -     Vitamin D 25 Hydroxy; Future  -     Magnesium; Future    Essential hypertension  -     CBC with Auto Differential; Future  -     Comprehensive Metabolic Panel; Future  -     Hepatic Function Panel; Future  -     Lipid Panel; Future  -     Sedimentation Rate;  Future  - Rheumatoid Factor; Future  -     TSH with Reflex; Future  -     Vitamin D 25 Hydroxy; Future  -     Magnesium; Future    Screen for colon cancer  -     Fecal DNA Colorectal cancer screening (Cologuard)    Need for shingles vaccine  -     zoster recombinant adjuvanted vaccine (SHINGRIX) 50 MCG/0.5ML SUSR injection; Inject 0.5 mLs into the muscle See Admin Instructions 1 dose now and repeat in 2-6 months    Encounter for hepatitis C screening test for low risk patient  -     Hepatitis C Antibody; Future    Arthralgia, unspecified joint  -     Kaykay Browning MD, Pain Medicine, [facility]    Need for vaccination for Strep pneumoniae  -     Pneumococcal polysaccharide vaccine 23-valent greater than or equal to 3yo subcutaneous/IM        No follow-ups on file. Orders Placed:  Orders Placed This Encounter   Procedures    Fecal DNA Colorectal cancer screening (Cologuard)    MAMMO DEXA BONE DENSITY SCAN    NARCISO DIGITAL SCREEN W OR WO CAD BILATERAL    CT Lung Screen (Annual)    Pneumococcal polysaccharide vaccine 23-valent greater than or equal to 3yo subcutaneous/IM    CBC with Auto Differential    Comprehensive Metabolic Panel    Hepatic Function Panel    Lipid Panel    Sedimentation Rate    Rheumatoid Factor    TSH with Reflex    Vitamin D 25 Hydroxy    Magnesium    Hepatitis C Antibody    Zane Cunha MD, Pain Medicine, [facility]    SD VISIT TO DISCUSS LUNG CA SCREEN W LDCT     Medications Prescribed:  Orders Placed This Encounter   Medications    zoster recombinant adjuvanted vaccine (SHINGRIX) 50 MCG/0.5ML SUSR injection     Sig: Inject 0.5 mLs into the muscle See Admin Instructions 1 dose now and repeat in 2-6 months     Dispense:  0.5 mL     Refill:  0     Future Appointments   Date Time Provider Kathy Prerna   6/14/2022  1:00 PM INES Burns - CNP [facility]  [facility]      Patient given educational materials - see patient instructions.   Discussed use, benefit, and side effects of prescribedmedications. All patient questions answered. Pt voiced understanding. Reviewed health maintenance. Instructed to continue current medications, diet and exercise. Patient agreed with treatment plan. Follow up as directed. Electronically signed by INES Perry CNP on 5/10/2022 at 3:35 PM  Low Dose CT (LDCT) Lung Screening criteria met:     Age 50-77(Medicare) or 50-80 ([facility] [place])   Pack year smoking >20   Still smoking or less than 15 year since quit   No sign or symptoms of lung cancer   > 11 months since last LDCT     Risks and benefits of lung cancer screening with LDCT scans discussed:    Significance of positive screen - False-positive LDCT results often occur. 95% of all positive results do not lead to a diagnosis of cancer. Usually further imaging can resolve most false-positive results; however, some patients may require invasive procedures. Over diagnosis risk - 10% to 12% of screen-detected lung cancer cases are over diagnosedthat is, the cancer would not have been detected in the patient's lifetime without the screening. Need for follow up screens annually to continue lung cancer screening effectiveness     Risks associated with radiation from annual LDCT- Radiation exposure is about the same as for a mammogram, which is about 1/3 of the annual background radiation exposure from everyday life. Starting screening at age 54 is not likely to increase cancer risk from radiation exposure. Patients with comorbidities resulting in life expectancy of < 10 years, or that would preclude treatment of an abnormality identified on CT, should not be screened due to lack of benefit.     To obtain maximal benefit from this screening, smoking cessation and long-term abstinence from smoking is critical

## 2022-05-13 ENCOUNTER — TELEPHONE (OUTPATIENT)
Dept: FAMILY MEDICINE CLINIC | Age: 77
End: 2022-05-13

## 2022-05-13 DIAGNOSIS — G89.29 CHRONIC MIDLINE LOW BACK PAIN, UNSPECIFIED WHETHER SCIATICA PRESENT: Primary | ICD-10-CM

## 2022-05-13 DIAGNOSIS — M50.90 CERVICAL BACK PAIN WITH EVIDENCE OF DISC DISEASE: ICD-10-CM

## 2022-05-13 DIAGNOSIS — G89.29 CHRONIC MIDLINE THORACIC BACK PAIN: ICD-10-CM

## 2022-05-13 DIAGNOSIS — M54.6 CHRONIC MIDLINE THORACIC BACK PAIN: ICD-10-CM

## 2022-05-13 DIAGNOSIS — M54.50 CHRONIC MIDLINE LOW BACK PAIN, UNSPECIFIED WHETHER SCIATICA PRESENT: Primary | ICD-10-CM

## 2022-05-13 NOTE — TELEPHONE ENCOUNTER
Referral to [facility] Pain/PMR  Received: Today  Juwan Figueroa [facility] Clinical Staff  Cc: P [facility] Central Referral Department  This office requires patient to have imaging of affected body parts completed within the last year, other than hands, wrists, feet, ankles, those they do not treat, other areas, such as cervical, thoracic, lumbar, etc. Please order these and have patient complete, then we can schedule an appointment for her. Thank you. Please advise message from Pain management.

## 2022-05-13 NOTE — TELEPHONE ENCOUNTER
Back xrays placed, please let patient know these need to be done prior to pain management seeing her

## 2022-05-14 LAB
ABSOLUTE BASO #: 0 X10E9/L (ref 0–0.2)
ABSOLUTE EOS #: 0.3 X10E9/L (ref 0–0.4)
ABSOLUTE LYMPH #: 3.2 X10E9/L (ref 1–3.5)
ABSOLUTE MONO #: 0.3 X10E9/L (ref 0–0.9)
ABSOLUTE NEUT #: 4.6 X10E9/L (ref 1.5–6.6)
ALBUMIN SERPL-MCNC: 4.6 G/DL (ref 3.2–5.3)
ALK PHOSPHATASE: 82 U/L (ref 39–130)
ALT SERPL-CCNC: 20 U/L (ref 0–31)
ANION GAP SERPL CALCULATED.3IONS-SCNC: 11 MMOL/L (ref 5–15)
AST SERPL-CCNC: 20 U/L (ref 0–41)
BASOPHILS RELATIVE PERCENT: 0.4 %
BILIRUB SERPL-MCNC: 0.5 MG/DL (ref 0.3–1.2)
BILIRUBIN DIRECT: 0.1 MG/DL (ref 0–0.4)
BUN BLDV-MCNC: 20 MG/DL (ref 5–27)
CALCIUM SERPL-MCNC: 10 MG/DL (ref 8.5–10.5)
CHLORIDE BLD-SCNC: 106 MMOL/L (ref 98–109)
CHOLESTEROL/HDL RATIO: 4.3 (ref 1–5)
CHOLESTEROL: 233 MG/DL (ref 150–200)
CO2: 27 MMOL/L (ref 22–32)
CREAT SERPL-MCNC: 0.98 MG/DL (ref 0.4–1)
EGFR AFRICAN AMERICAN: >60 ML/MIN/1.73SQ.M
EGFR IF NONAFRICAN AMERICAN: 55 ML/MIN/1.73SQ.M
EOSINOPHILS RELATIVE PERCENT: 3.4 %
GLUCOSE: 93 MG/DL (ref 65–99)
HCT VFR BLD CALC: 36.4 % (ref 35–47)
HDLC SERPL-MCNC: 54 MG/DL
HEMOGLOBIN: 12.2 G/DL (ref 11.7–15.5)
HEPATITIS C ANTIBODY: NORMAL
LDL CHOLESTEROL CALCULATED: 150 MG/DL
LDL/HDL RATIO: 2.8
LYMPHOCYTE %: 37.5 %
MAGNESIUM: 2.1 MG/DL (ref 1.8–2.6)
MCH RBC QN AUTO: 32.5 PG (ref 27–34)
MCHC RBC AUTO-ENTMCNC: 33.4 G/DL (ref 32–36)
MCV RBC AUTO: 97 FL (ref 80–100)
MONOCYTES # BLD: 3.6 %
NEUTROPHILS RELATIVE PERCENT: 55.1 %
PDW BLD-RTO: 17.9 % (ref 11.5–15)
PLATELETS: 249 X10E9/L (ref 150–450)
PMV BLD AUTO: 10.5 FL (ref 7–12)
POTASSIUM SERPL-SCNC: 4.7 MMOL/L (ref 3.5–5)
RBC: 3.74 X10E12/L (ref 3.8–5.2)
RHEUMATOID FACTOR: <10 IU/ML
SEDIMENTATION RATE, ERYTHROCYTE: 46 MM/H (ref 0–30)
SODIUM BLD-SCNC: 144 MMOL/L (ref 134–146)
T4 FREE: 0.69 NG/DL (ref 0.61–1.6)
TOTAL PROTEIN: 7 G/DL (ref 6–8)
TRIGL SERPL-MCNC: 143 MG/DL (ref 27–150)
TSH SERPL DL<=0.05 MIU/L-ACNC: 5.17 UIU/ML (ref 0.49–4.67)
VITAMIN D 25-HYDROXY: 31.2 NG/ML (ref 30–100)
VLDLC SERPL CALC-MCNC: 29 MG/DL (ref 0–30)
WBC: 8.4 X10E9/L (ref 4–11)

## 2022-05-16 ENCOUNTER — TELEPHONE (OUTPATIENT)
Dept: FAMILY MEDICINE CLINIC | Age: 77
End: 2022-05-16

## 2022-05-16 NOTE — TELEPHONE ENCOUNTER
[facility] radiology called requesting Dexa Scan, Mammogram, and CT Lung Scan orders to be faxed to [phone] due to patient has an appointment scheduled for tomorrow. Orders have been faxed.

## 2022-05-17 ENCOUNTER — HOSPITAL ENCOUNTER (OUTPATIENT)
Dept: GENERAL RADIOLOGY | Age: 77
Discharge: HOME | End: 2022-05-17
Payer: MEDICARE

## 2022-05-17 ENCOUNTER — HOSPITAL ENCOUNTER (OUTPATIENT)
Age: 77
Discharge: HOME | End: 2022-05-17
Payer: MEDICARE

## 2022-05-17 ENCOUNTER — TELEPHONE (OUTPATIENT)
Dept: FAMILY MEDICINE CLINIC | Age: 77
End: 2022-05-17

## 2022-05-17 DIAGNOSIS — G89.29 CHRONIC MIDLINE THORACIC BACK PAIN: ICD-10-CM

## 2022-05-17 DIAGNOSIS — G89.29 CHRONIC MIDLINE LOW BACK PAIN, UNSPECIFIED WHETHER SCIATICA PRESENT: ICD-10-CM

## 2022-05-17 DIAGNOSIS — M54.6 CHRONIC MIDLINE THORACIC BACK PAIN: ICD-10-CM

## 2022-05-17 DIAGNOSIS — M54.50 CHRONIC MIDLINE LOW BACK PAIN, UNSPECIFIED WHETHER SCIATICA PRESENT: ICD-10-CM

## 2022-05-17 DIAGNOSIS — M50.90 CERVICAL BACK PAIN WITH EVIDENCE OF DISC DISEASE: ICD-10-CM

## 2022-05-17 PROCEDURE — 72040 X-RAY EXAM NECK SPINE 2-3 VW: CPT

## 2022-05-17 PROCEDURE — 72100 X-RAY EXAM L-S SPINE 2/3 VWS: CPT

## 2022-05-17 PROCEDURE — 72072 X-RAY EXAM THORAC SPINE 3VWS: CPT

## 2022-05-25 DIAGNOSIS — E04.1 THYROID NODULE: Primary | ICD-10-CM

## 2022-05-26 ENCOUNTER — TELEPHONE (OUTPATIENT)
Dept: FAMILY MEDICINE CLINIC | Age: 77
End: 2022-05-26

## 2022-05-26 NOTE — TELEPHONE ENCOUNTER
Written by INES Coffman CNP on 5/25/2022 11:32 AM EDT  Seen by patient Winsome Nash on 5/25/2022  5:31 PM

## 2022-05-26 NOTE — TELEPHONE ENCOUNTER
----- Message from INES Nagel CNP sent at 5/25/2022 11:32 AM EDT -----  Lung screen fine - repeat in 1 year. Does show a thyroid nodule that we would like to look at with an ultrasound.  Will order

## 2022-05-29 LAB — NONINV COLON CA DNA+OCC BLD SCRN STL QL: NEGATIVE

## 2022-05-31 ENCOUNTER — TELEPHONE (OUTPATIENT)
Dept: FAMILY MEDICINE CLINIC | Age: 77
End: 2022-05-31

## 2022-05-31 NOTE — TELEPHONE ENCOUNTER
----- Message from INES Boucher CNP sent at 5/31/2022 10:43 AM EDT -----  FIT - Cologuard - negative (normal)

## 2022-06-14 ENCOUNTER — OFFICE VISIT (OUTPATIENT)
Dept: FAMILY MEDICINE CLINIC | Age: 77
End: 2022-06-14
Payer: MEDICARE

## 2022-06-14 VITALS
BODY MASS INDEX: 25.66 KG/M2 | TEMPERATURE: 97.9 F | RESPIRATION RATE: 16 BRPM | DIASTOLIC BLOOD PRESSURE: 78 MMHG | HEIGHT: 65 IN | OXYGEN SATURATION: 97 % | SYSTOLIC BLOOD PRESSURE: 132 MMHG | HEART RATE: 62 BPM | WEIGHT: 154 LBS

## 2022-06-14 DIAGNOSIS — N18.30 STAGE 3 CHRONIC KIDNEY DISEASE, UNSPECIFIED WHETHER STAGE 3A OR 3B CKD (HCC): ICD-10-CM

## 2022-06-14 DIAGNOSIS — E55.9 VITAMIN D DEFICIENCY: Primary | ICD-10-CM

## 2022-06-14 DIAGNOSIS — M85.80 OSTEOPENIA, UNSPECIFIED LOCATION: ICD-10-CM

## 2022-06-14 DIAGNOSIS — E04.1 THYROID NODULE: ICD-10-CM

## 2022-06-14 DIAGNOSIS — E78.5 HYPERLIPIDEMIA, UNSPECIFIED HYPERLIPIDEMIA TYPE: ICD-10-CM

## 2022-06-14 PROCEDURE — 1123F ACP DISCUSS/DSCN MKR DOCD: CPT | Performed by: NURSE PRACTITIONER

## 2022-06-14 PROCEDURE — 99213 OFFICE O/P EST LOW 20 MIN: CPT | Performed by: NURSE PRACTITIONER

## 2022-06-14 PROCEDURE — 3288F FALL RISK ASSESSMENT DOCD: CPT | Performed by: NURSE PRACTITIONER

## 2022-06-14 RX ORDER — CHOLECALCIFEROL (VITAMIN D3) 125 MCG
CAPSULE ORAL
Qty: 30 TABLET | Refills: 5 | Status: SHIPPED | OUTPATIENT
Start: 2022-06-14

## 2022-06-14 SDOH — ECONOMIC STABILITY: FOOD INSECURITY: WITHIN THE PAST 12 MONTHS, YOU WORRIED THAT YOUR FOOD WOULD RUN OUT BEFORE YOU GOT MONEY TO BUY MORE.: NEVER TRUE

## 2022-06-14 SDOH — ECONOMIC STABILITY: FOOD INSECURITY: WITHIN THE PAST 12 MONTHS, THE FOOD YOU BOUGHT JUST DIDN'T LAST AND YOU DIDN'T HAVE MONEY TO GET MORE.: NEVER TRUE

## 2022-06-14 ASSESSMENT — PATIENT HEALTH QUESTIONNAIRE - PHQ9
SUM OF ALL RESPONSES TO PHQ9 QUESTIONS 1 & 2: 0
2. FEELING DOWN, DEPRESSED OR HOPELESS: 0
SUM OF ALL RESPONSES TO PHQ QUESTIONS 1-9: 0
1. LITTLE INTEREST OR PLEASURE IN DOING THINGS: 0
SUM OF ALL RESPONSES TO PHQ QUESTIONS 1-9: 0

## 2022-06-14 ASSESSMENT — ENCOUNTER SYMPTOMS
EYE REDNESS: 0
ABDOMINAL PAIN: 0
BLOOD IN STOOL: 0
EYE DISCHARGE: 0
SHORTNESS OF BREATH: 0
COLOR CHANGE: 0
RHINORRHEA: 0
ANAL BLEEDING: 0
SORE THROAT: 0
ABDOMINAL DISTENTION: 0
DIARRHEA: 0
CONSTIPATION: 0
COUGH: 0
NAUSEA: 0

## 2022-06-14 ASSESSMENT — SOCIAL DETERMINANTS OF HEALTH (SDOH): HOW HARD IS IT FOR YOU TO PAY FOR THE VERY BASICS LIKE FOOD, HOUSING, MEDICAL CARE, AND HEATING?: NOT HARD AT ALL

## 2022-06-14 NOTE — PROGRESS NOTES
Health Maintenance Due   Topic Date Due    Annual Wellness Visit (AWV)  Never done    Depression Screen  Never done    Low dose CT lung screening  Never done

## 2022-06-14 NOTE — PROGRESS NOTES
[address] [facility]  [phone] (phone)  [phone] (fax)    Visit Date: 6/14/2022    Giuseppe Herrera is a 68 y.o. female who presents today for:  Chief Complaint   Patient presents with    Follow-up     HPI:     CT scan - lung screen -   IMPRESSION:          1.  3 cm nodule the right lobe of the thyroid gland and correlation with     thyroid ultrasound is recommended. .          2.   Incidental findings include mild bilateral apical scarring. .          ASSESSMENT CATEGORY:          LungRADS 1 - Negative.  Continue annual screening with LDCT in 12 months,     per established ACR guidelines.      FIT test - negative    Mammogram - ok    Mother and daughter are both on thyroid medication - does not want to start medication    HPI  Health Maintenance   Topic Date Due    Annual Wellness Visit (AWV)  Never done    Depression Screen  Never done    Low dose CT lung screening  Never done    Shingles vaccine (2 of 2) 07/13/2022    DTaP/Tdap/Td vaccine (2 - Td or Tdap) 10/16/2022    DEXA (modify frequency per FRAX score)  Completed    Flu vaccine  Completed    Pneumococcal 65+ years Vaccine  Completed    COVID-19 Vaccine  Completed    Hepatitis C screen  Completed    Hepatitis A vaccine  Aged Out    Hepatitis B vaccine  Aged Out    Hib vaccine  Aged Out    Meningococcal (ACWY) vaccine  Aged Out     Past Medical History:   Diagnosis Date    Bipolar 1 disorder, depressed, full remission ([address]     Chronic renal disease, stage III ([address] [417185] 6/14/2022    Degenerative arthritis of lumbar spine 12/08    Essential hypertension 5/29/2018    Hyperglycemia     hx of    Hyperlipidemia     Migraine     Osteopenia 12/08    Smoker     Thyromegaly     hx of      Past Surgical History:   Procedure Laterality Date    CARPAL TUNNEL RELEASE Right 09/20/2018    COLONOSCOPY  03/08    CYST REMOVAL      head    DILATION AND CURETTAGE OF UTERUS      TOOTH EXTRACTION N/A 02/05/2019     Family History Problem Relation Age of Onset    Thyroid Disease Mother     High Cholesterol Mother     Stroke Father     High Blood Pressure Brother     Stroke Brother      Social History     Tobacco Use    Smoking status: Former Smoker     Packs/day: 1.00     Years: 40.00     Pack years: 40.00     Types: Cigarettes     Quit date:      Years since quittin.4    Smokeless tobacco: Never Used   Substance Use Topics    Alcohol use: No      Current Outpatient Medications   Medication Sig Dispense Refill    Cholecalciferol (VITAMIN D3) 50 MCG (2000 UT) TABS Take one tablet daily 30 tablet 5    Multiple Vitamins-Minerals (HAIR SKIN AND NAILS FORMULA) TABS Take 1 tablet by mouth daily      Misc Natural Products (JOINT HEALTH) CAPS Take 1 tablet by mouth daily      acetaminophen (TYLENOL) 500 MG tablet Take 500 mg by mouth in the morning and at bedtime      Turmeric (QC TUMERIC COMPLEX PO) Take 1 tablet by mouth daily      B-Complex-C TABS Take 1 tablet by mouth daily      magnesium oxide (MAG-OX) 400 MG tablet Take 400 mg by mouth daily      calcium carbonate (OSCAL) 500 MG TABS tablet Take 500 mg by mouth daily      zoster recombinant adjuvanted vaccine (SHINGRIX) 50 MCG/0.5ML SUSR injection Inject 0.5 mLs into the muscle See Admin Instructions 1 dose now and repeat in 2-6 months 0.5 mL 0    Glucosamine-Chondroitin (GLUCOSAMINE CHONDR COMPLEX PO) Take by mouth       ibuprofen (ADVIL;MOTRIN) 200 MG tablet Take 200 mg by mouth every morning       cetirizine (ZYRTEC) 10 MG tablet Take 10 mg by mouth daily      naproxen sodium (ALEVE) 220 MG tablet Take 220 mg by mouth 2 times daily (with meals)  60 tablet 3     No current facility-administered medications for this visit. Allergies   Allergen Reactions    Sulfa Antibiotics        Subjective:    Review of Systems   Constitutional: Negative for chills, fatigue and fever. HENT: Negative for congestion, ear pain, postnasal drip, rhinorrhea and sore throat. Eyes: Negative for discharge and redness. Respiratory: Negative for cough and shortness of breath. Cardiovascular: Negative for chest pain and leg swelling. Gastrointestinal: Negative for abdominal distention, abdominal pain, anal bleeding, blood in stool, constipation, diarrhea and nausea. Skin: Negative for color change and rash. Neurological: Negative for facial asymmetry, speech difficulty and weakness. Hematological: Does not bruise/bleed easily. Psychiatric/Behavioral: Negative for agitation and confusion. Objective:     Vitals:    06/14/22 1318   BP: 132/78   Site: Right Upper Arm   Position: Sitting   Cuff Size: Medium Adult   Pulse: 62   Resp: 16   Temp: 97.9 °F (36.6 °C)   TempSrc: Skin   SpO2: 97%   Weight: 154 lb (69.9 kg)   Height: 5' 5\" (1.651 m)       Body mass index is 25.63 kg/m². Wt Readings from Last 3 Encounters:   06/14/22 154 lb (69.9 kg)   05/10/22 154 lb 8 oz (70.1 kg)   08/06/19 157 lb 3.2 oz (71.3 kg)     BP Readings from Last 3 Encounters:   06/14/22 132/78   05/10/22 138/76   08/06/19 128/72     Physical Exam  Constitutional:       General: She is not in acute distress. Appearance: She is well-developed. She is not ill-appearing or diaphoretic. HENT:      Head: Normocephalic and atraumatic. Right Ear: Hearing and external ear normal. No decreased hearing noted. Left Ear: Hearing and external ear normal. No decreased hearing noted. Nose: Nose normal. No nasal deformity. Eyes:      General:         Right eye: No discharge. Left eye: No discharge. Conjunctiva/sclera: Conjunctivae normal.   Pulmonary:      Effort: Pulmonary effort is normal. No respiratory distress. Abdominal:      General: There is no distension. Tenderness: There is no guarding. Musculoskeletal:         General: No tenderness or deformity. Normal range of motion. Cervical back: Normal range of motion and neck supple.    Skin:     Coloration: Skin is not pale. Findings: No erythema or rash (On exposed areas). Neurological:      Mental Status: She is alert. Gait: Gait normal.   Psychiatric:         Speech: Speech normal.         Behavior: Behavior normal.         Thought Content: Thought content normal.         Judgment: Judgment normal.         Lab Results   Component Value Date    WBC 8.4 05/13/2022    HGB 12.2 05/13/2022    HCT 36.4 05/13/2022     05/13/2022    CHOL 233 (H) 05/13/2022    TRIG 143 05/13/2022    HDL 54 05/13/2022    LDLCALC 150 (H) 05/13/2022    AST 20 05/13/2022     05/13/2022    K 4.7 05/13/2022     05/13/2022    CREATININE 0.98 05/13/2022    BUN 20 05/13/2022    CO2 27 05/13/2022    TSH 5.17 (H) 05/13/2022    MG 2.1 05/13/2022    CALCIUM 10.0 05/13/2022    VITD25 31.2 05/13/2022     Assessment:       Diagnosis Orders   1. Vitamin D deficiency  Cholecalciferol (VITAMIN D3) 50 MCG (2000 UT) TABS   2. Thyroid nodule     3. Osteopenia, unspecified location     4. Hyperlipidemia, unspecified hyperlipidemia type  Lipid Panel   5. Stage 3 chronic kidney disease, unspecified whether stage 3a or 3b CKD ([facility]         Plan:   Arnaldo Talley was seen today for follow-up. Diagnoses and all orders for this visit:    Vitamin D deficiency  -     Cholecalciferol (VITAMIN D3) 50 MCG (2000 UT) TABS; Take one tablet daily    Thyroid nodule    Osteopenia, unspecified location    Hyperlipidemia, unspecified hyperlipidemia type  -     Lipid Panel; Future    Stage 3 chronic kidney disease, unspecified whether stage 3a or 3b CKD ([facility]        Return in about 6 months (around 12/14/2022).     Orders Placed:  Orders Placed This Encounter   Procedures    Lipid Panel     Medications Prescribed:  Orders Placed This Encounter   Medications    Cholecalciferol (VITAMIN D3) 50 MCG (2000 UT) TABS     Sig: Take one tablet daily     Dispense:  30 tablet     Refill:  5     Future Appointments   Date Time Provider Kathy Graff   6/21/2022 10:00 AM INES Preciado CNP N [facility] - [address] [place] [address]   12/15/2022 10:40 AM INES Berrios CNP [facility] [address]      Patient given educational materials - see patient instructions. Discussed use, benefit, and side effects of prescribedmedications. All patient questions answered. Pt voiced understanding. Reviewed health maintenance. Instructed to continue current medications, diet and exercise. Patient agreed with treatment plan. Follow up as directed.     Electronically signed by INES Perez CNP on 6/14/2022 at 2:41 PM

## 2022-06-21 ENCOUNTER — OFFICE VISIT (OUTPATIENT)
Dept: PHYSICAL MEDICINE AND REHAB | Age: 77
End: 2022-06-21
Payer: MEDICARE

## 2022-06-21 VITALS
SYSTOLIC BLOOD PRESSURE: 140 MMHG | WEIGHT: 154 LBS | DIASTOLIC BLOOD PRESSURE: 80 MMHG | HEART RATE: 76 BPM | BODY MASS INDEX: 25.66 KG/M2 | HEIGHT: 65 IN

## 2022-06-21 DIAGNOSIS — M51.36 DDD (DEGENERATIVE DISC DISEASE), LUMBAR: ICD-10-CM

## 2022-06-21 DIAGNOSIS — M79.642 PAIN IN BOTH HANDS: ICD-10-CM

## 2022-06-21 DIAGNOSIS — M19.019 SHOULDER ARTHRITIS: ICD-10-CM

## 2022-06-21 DIAGNOSIS — M51.34 DDD (DEGENERATIVE DISC DISEASE), THORACIC: ICD-10-CM

## 2022-06-21 DIAGNOSIS — M79.641 PAIN IN BOTH HANDS: ICD-10-CM

## 2022-06-21 DIAGNOSIS — M54.16 LUMBAR RADICULITIS: ICD-10-CM

## 2022-06-21 DIAGNOSIS — M50.30 DDD (DEGENERATIVE DISC DISEASE), CERVICAL: ICD-10-CM

## 2022-06-21 DIAGNOSIS — M48.061 SPINAL STENOSIS OF LUMBAR REGION WITHOUT NEUROGENIC CLAUDICATION: ICD-10-CM

## 2022-06-21 DIAGNOSIS — M47.816 SPONDYLOSIS OF LUMBAR REGION WITHOUT MYELOPATHY OR RADICULOPATHY: Primary | ICD-10-CM

## 2022-06-21 DIAGNOSIS — M25.552 BILATERAL HIP PAIN: ICD-10-CM

## 2022-06-21 DIAGNOSIS — M19.90 INFLAMMATORY ARTHRITIS: ICD-10-CM

## 2022-06-21 DIAGNOSIS — M25.551 BILATERAL HIP PAIN: ICD-10-CM

## 2022-06-21 PROCEDURE — 1123F ACP DISCUSS/DSCN MKR DOCD: CPT | Performed by: NURSE PRACTITIONER

## 2022-06-21 PROCEDURE — 99205 OFFICE O/P NEW HI 60 MIN: CPT | Performed by: NURSE PRACTITIONER

## 2022-06-21 ASSESSMENT — ENCOUNTER SYMPTOMS: BACK PAIN: 1

## 2022-06-21 NOTE — PROGRESS NOTES
HPI:     ChiefComplaint: Low back pain, Mid back pain and Neck pain  Joint pain  Feet ankles pain     HPI   New pt here for multiple pain complaints mostly to all joints feet shoulders hip knees neck back. Her wrist hands and  lower leg pains are her main pain complaint. She has not seen ortho for any of these pain issues. She states she had Rheumatology screen  labs they were negative. I did find a Rheumatoid factor and sed rate in chart. Rheumatoid factor negative sed rate elevated. H/O CTS surgery 2019. States mild to no  Relief. She has never seen a podiatrist also for her foot ankle pain. She is osteopenic. She has shoulder clicking grinding  hip clicking knees grind at times with doing certain activities and so,etimes in the mornings  Neck and back has aching and mostly stiffness. Hands are aching stiffness tightness swelling at times. She has weakness in grasp greater on the right. Hands are tender to touch doesn't like to shake hands uses her forearms a lot to do things instead of her hands. She has had a right broken wrist in past and a piece of wood lodged into left wrist 30 yrs ago or so. Patient pain increases with twisting , turning torso, pushing, walking, standing, raising arms, stairs, getting up and down and housework or working at job. Treatments tried PT/HEP, NSAIDS and braces Inversion table, compression gloves for hands    Pain description burning, aching, numbness/tingling, pins and needles and grinding popping catching crepitus  Pain rating  scale 1-10 highest  8  lowest  1  average   5  Alleviating Factors:rest   Medications tried: NSAIDS  Tylenol Tumeric, Glucosamine, compression gloves   Any leg weakness, saddle paresthesia, bowel or bladder incontinence yes or no? NO  PT: No,   Any prior spine or ortho surgeon consult and with whom No  Does patient follow Rheumatology?  No    Lab screening ordered: CRP JAVIER  Reviewed Sed rate and Rheumatoid factor    Any h/o long term steroid use?NO    Radiology:  1. Mild uncinate spurring C4-C5 left side C5-6 right side. Straightening of the normal cervical lordosis. 2. Marked disc space narrowing C5-6 and C6-7. Mild disc space narrowing C4-5. Mild antegrade spondylolisthesis C4 upon C5, 2 mm. Slight retrograde spinal listhesis C5 upon C6, 1.5 mm. Mild vertebral body spondylosis scattered in the cervical spine. 3. No fracture seen. Paravertebral soft tissues unremarkable.                 1. Mild levoscoliosis entire thoracic spine. Cannot exclude muscle spasm right side. 2. Minimal vertebral body spondylosis scattered in the thoracic spine. Disc spaces well-maintained. Paravertebral soft tissues unremarkable.         1. Mild thoracolumbar dextro scoliosis. Cannot exclude muscle spasm left side. 2. Partial sacralization L5 with pseudoarthrosis left side. Moderate degenerative facet arthropathy lower 2 lumbar levels. 3. No fracture is seen. Antegrade spondylolisthesis of L4 upon L5, 6 mm. Minimal vertebral body spondylosis scattered in the lumbar spine. Sacroiliac joints unremarkable.             The patient is allergic to sulfa antibiotics. Subjective:      Review of Systems   Constitutional: Positive for activity change. Respiratory:        Ex smoker   Cardiovascular:        HTN   Endocrine:        Thyroid  nodule   Genitourinary:        CKD stage 3   Musculoskeletal: Positive for arthralgias, back pain, gait problem, myalgias, neck pain and neck stiffness. Psychiatric/Behavioral: The patient is nervous/anxious. Objective:     Vitals:    06/21/22 0953 06/21/22 1001   BP: (!) 140/80 (!) 140/80   Site: Left Upper Arm    Position: Sitting    Cuff Size: Medium Adult    Pulse: 76    Weight: 154 lb (69.9 kg)    Height: 5' 5\" (1.651 m)        Physical Exam  Vitals and nursing note reviewed. Constitutional:       General: She is not in acute distress. Appearance: She is well-developed. She is not diaphoretic.    HENT:      Head: Normocephalic and atraumatic. Right Ear: External ear normal.      Left Ear: External ear normal.      Nose: Nose normal.      Mouth/Throat:      Pharynx: No oropharyngeal exudate. Eyes:      General: No scleral icterus. Right eye: No discharge. Left eye: No discharge. Conjunctiva/sclera: Conjunctivae normal.      Pupils: Pupils are equal, round, and reactive to light. Neck:      Thyroid: No thyromegaly. Cardiovascular:      Rate and Rhythm: Normal rate and regular rhythm. Heart sounds: Normal heart sounds. No murmur heard. No friction rub. No gallop. Pulmonary:      Effort: Pulmonary effort is normal. No respiratory distress. Breath sounds: Normal breath sounds. No wheezing or rales. Chest:      Chest wall: No tenderness. Abdominal:      General: Bowel sounds are normal. There is no distension. Palpations: Abdomen is soft. Tenderness: There is no abdominal tenderness. There is no guarding or rebound. Musculoskeletal:      Right shoulder: Tenderness and bony tenderness present. Decreased range of motion. Decreased strength. Left shoulder: Tenderness and bony tenderness present. Decreased range of motion. Decreased strength. Right wrist: Tenderness and bony tenderness present. Decreased range of motion. Left wrist: Tenderness and bony tenderness present. Decreased range of motion. Right hand: Swelling, deformity, tenderness and bony tenderness present. Decreased range of motion. Decreased strength. Decreased sensation. Left hand: Swelling, tenderness and bony tenderness present. Decreased range of motion. Decreased strength. Decreased sensation. Cervical back: Full passive range of motion without pain, normal range of motion and neck supple. Tenderness, bony tenderness and crepitus present. No edema, erythema or rigidity. No muscular tenderness. Normal range of motion. Thoracic back: Bony tenderness present.    Skin: General: Skin is warm. Coloration: Skin is not pale. Findings: No erythema or rash. Neurological:      Mental Status: She is alert and oriented to person, place, and time. She is not disoriented. Cranial Nerves: No cranial nerve deficit. Sensory: No sensory deficit. Motor: No atrophy or abnormal muscle tone. Coordination: Coordination normal.      Gait: Gait normal.      Deep Tendon Reflexes: Reflexes are normal and symmetric. Babinski sign absent on the right side. Psychiatric:         Attention and Perception: She is attentive. Mood and Affect: Mood is not anxious or depressed. Affect is not labile, blunt, angry or inappropriate. Speech: She is communicative. Speech is not rapid and pressured, delayed, slurred or tangential.         Behavior: Behavior is not agitated, slowed, aggressive, withdrawn, hyperactive or combative. Thought Content: Thought content is not paranoid or delusional. Thought content does not include homicidal or suicidal ideation. Thought content does not include homicidal or suicidal plan. Cognition and Memory: Memory is not impaired. She does not exhibit impaired recent memory or impaired remote memory. Judgment: Judgment is not impulsive or inappropriate. MARC  Patricks test  positive  Yeoman's or Gaenslen's  positive  Kemps  positive  Spurlings  na  Beck'sna         Assessment:     1. Spondylosis of lumbar region without myelopathy or radiculopathy    2. DDD (degenerative disc disease), cervical    3. DDD (degenerative disc disease), lumbar    4. DDD (degenerative disc disease), thoracic    5. Shoulder arthritis    6. Pain in both hands    7. Bilateral hip pain    8. Spinal stenosis of lumbar region without neurogenic claudication    9. Lumbar radiculitis    10.  Inflammatory arthritis            Plan:       Testing, Labs or Radiology Reviewed: Cervical Lumbar Thoracic  XR reviewed   Hand XRs ordered Lumbar MRI    Labs: JAVIER CRP   Procedures: refuses at this time discussed shoulder injections LESI Lumbar facet injections    Medications:BIOMED for hands feet shoulders   Wrist braces encouraged. Water PT encouraged           Meds. Prescribed:   No orders of the defined types were placed in this encounter. Return in about 6 weeks (around 8/2/2022) for F/U MRI hand XRs.          Electronically signed by INES Luz CNP on 6/21/2022 at 1:08 PM

## 2022-06-21 NOTE — PROGRESS NOTES
[address] [facility] [address]  Dept: [phone]  Dept Fax: [phone]: [phone]    Visit Date: 6/21/2022    Jacques Us is a 68 y.o. female who is referred for pain management evaluation and treatment per Dr. Justen Zendejas. CAGE and CAGE-AID Questions   1. In the last three months, have you felt you should cut down or stop drinking or using drugs? Yes []        No [x]     2. In the last three months, has anyone annoyed you or gotten on your nerves by telling you to cut down or stop drinking or using drugs? Yes []        No [x]     3. In the last three months, have you felt guilty or bad about how much you drink or use drugs? Yes []        No [x]     4. In the last three months, have you been waking up wanting to have an alcoholic drink or use drugs? Yes []        No [x]        Opioid Risk Tool:  Clinician Form       1. Family History of Substance Abuse: Female Male    Alcohol   []1   []3    Illegal drugs   []2   []3    Prescription drugs     []4   []4   2. Personal History of Substance Abuse:          Alcohol   []3   []3    Illegal drugs   []4   []4    Prescription drugs     []5   []5   3. Age (gena box if between 12 and 39):     []1   []1   4. History of Preadolescent Sexual Abuse:     []3   []0   5. Psychological Disease:      Attention deficit disorder, obsessive-compulsive disorder, bipolar, schizophrenia   [x]2   []2      Depression     []1   []1    Scoring Totals 2      Total Score  Low Risk  Moderate Risk  High Risk   Risk Category   0 - 3   4 - 7   8 or Above      Patient states symptoms interfere with:  A.  General Activity:  yes   B. Mood: yes    C. Walking Ability:   yes   D. Normal Work (Includes both work outside the home and housework):   yes    E.  Relations with Other People:  yes   F. Sleep:   yes   G.  Enjoyment of Life:  yes

## 2022-06-28 ENCOUNTER — HOSPITAL ENCOUNTER (OUTPATIENT)
Dept: GENERAL RADIOLOGY | Age: 77
Discharge: HOME | End: 2022-06-28
Payer: MEDICARE

## 2022-06-28 ENCOUNTER — HOSPITAL ENCOUNTER (OUTPATIENT)
Dept: ULTRASOUND IMAGING | Age: 77
Discharge: HOME | End: 2022-06-28
Payer: MEDICARE

## 2022-06-28 DIAGNOSIS — E04.1 THYROID NODULE: ICD-10-CM

## 2022-06-28 DIAGNOSIS — M79.642 PAIN IN BOTH HANDS: ICD-10-CM

## 2022-06-28 DIAGNOSIS — M79.641 PAIN IN BOTH HANDS: ICD-10-CM

## 2022-06-28 PROCEDURE — 73120 X-RAY EXAM OF HAND: CPT

## 2022-06-28 PROCEDURE — 76536 US EXAM OF HEAD AND NECK: CPT

## 2022-06-29 ENCOUNTER — TELEPHONE (OUTPATIENT)
Dept: FAMILY MEDICINE CLINIC | Age: 77
End: 2022-06-29

## 2022-06-29 DIAGNOSIS — E04.1 THYROID NODULE: Primary | ICD-10-CM

## 2022-06-29 LAB — C-REACTIVE PROTEIN: <3 G/DL

## 2022-06-29 NOTE — TELEPHONE ENCOUNTER
PATIENT NOTIFIED AND VERBALIZED UNDERSTANDING. Also spoke with provider who gave her detailed explanation of test and procedure.

## 2022-06-30 LAB — ANTI-NUCLEAR ANTIBODY (ANA): NEGATIVE

## 2022-07-14 ENCOUNTER — HOSPITAL ENCOUNTER (OUTPATIENT)
Dept: MRI IMAGING | Age: 77
Discharge: HOME | End: 2022-07-14
Payer: MEDICARE

## 2022-07-14 DIAGNOSIS — M47.816 SPONDYLOSIS OF LUMBAR REGION WITHOUT MYELOPATHY OR RADICULOPATHY: ICD-10-CM

## 2022-07-14 DIAGNOSIS — M50.30 DDD (DEGENERATIVE DISC DISEASE), CERVICAL: ICD-10-CM

## 2022-07-14 DIAGNOSIS — M54.16 LUMBAR RADICULITIS: ICD-10-CM

## 2022-07-14 PROCEDURE — 72148 MRI LUMBAR SPINE W/O DYE: CPT

## 2022-07-28 NOTE — PROGRESS NOTES
L4-5 disc space. There is no bone marrow edema. There are no compression fractures. No pars defects are noted. The distal spinal cord, conus medullaris and cauda equina are normal.        There are no gross abnormalities in the visualized aspects of the distal thoracic spine. On the axial images, at T12-L1, there is no disc herniation, canal or foraminal stenosis. At L1-L2, there is a 1.2 mm bulging disc and facet hypertrophy. This causes mild canal and mild-to-moderate bilateral foraminal stenosis. At L2-L3, there is a 1.9 mm bulging disc and facet hypertrophy. This results in mild-to-moderate canal and bilateral foraminal stenosis. At L3-L4, there is a 1.9 mm bulging disc and facet hypertrophy. This results in mild-to-moderate canal and bilateral foraminal stenosis. At L4-L5, there is a 3.8 mm bulging disc, facet and ligamentous hypertrophy. The combination of these findings results in moderate to severe canal and bilateral foraminal stenosis. At L5-S1, there is a 1.8 mm ventral and right-sided disc protrusion and facet hypertrophy. This causes mild canal, moderate to severe right and mild-to-moderate left-sided foraminal stenosis. There is degenerative change involving the sacroiliac joints bilaterally. .               Impression       1. Degenerative changes in the lumbar spine most marked at L4-5, at which level there is moderate to severe canal and bilateral foraminal stenosis. 2. There is mild canal, moderate to severe right and mild-to-moderate left-sided foraminal stenosis at L5-S1.   3. There is mild to moderate canal and bilateral foraminal stenosis at L2-3 and L3-4.   4. There is mild canal and mild-to-moderate bilateral foraminal stenosis at L1-2 and L2-3.   5. Degenerative change involving the sacroiliac joints bilaterally. FINDINGS:       There is diffuse osteopenia.  There is degenerative change especially at the second and third metacarpophalangeal and first carpometacarpal joints. There is widening of the scapholunate interval which may represent a ligamentous injury. There is slight    deformity of the distal pole of the navicular bone possibly related to previous trauma. The remaining bony structures are intact The soft tissues are normal.               Impression           1. Diffuse osteopenia. 2. Degenerative changes especially at the second and third metacarpophalangeal and first carpometacarpal joints. 3. Widening of the scapholunate interval which may be secondary to ligamentous injury. 4. Deformity of the distal pole of the navicular which may be secondary to previous trauma. FINDINGS:       There is diffuse osteopenia. There is degenerative change especially at the level of the metacarpophalangeal, radiocarpal and first carpometacarpal joints. There are no fractures. . The soft tissues are normal.               Impression           1. Diffuse osteopenia. 2. Degenerative changes especially at the metacarpophalangeal, radiocarpal and first carpometacarpal joints. 3. Otherwise negative left hand radiographs. Cervical MRI Lumbar XR  1. Mild uncinate spurring C4-C5 left side C5-6 right side. Straightening of the normal cervical lordosis. 2. Marked disc space narrowing C5-6 and C6-7. Mild disc space narrowing C4-5. Mild antegrade spondylolisthesis C4 upon C5, 2 mm. Slight retrograde spinal listhesis C5 upon C6, 1.5 mm. Mild vertebral body spondylosis scattered in the cervical spine. 3. No fracture seen. Paravertebral soft tissues unremarkable. 1. Mild levoscoliosis entire thoracic spine. Cannot exclude muscle spasm right side. 2. Minimal vertebral body spondylosis scattered in the thoracic spine. Disc spaces well-maintained. Paravertebral soft tissues unremarkable. 1. Mild thoracolumbar dextro scoliosis. Cannot exclude muscle spasm left side.    2. Partial sacralization L5 with pseudoarthrosis left side. Moderate degenerative facet arthropathy lower 2 lumbar levels. 3. No fracture is seen. Antegrade spondylolisthesis of L4 upon L5, 6 mm. Minimal vertebral body spondylosis scattered in the lumbar spine. Sacroiliac joints unremarkable. The patientis allergic to sulfa antibiotics. Subjective:      Review of Systems   Constitutional:  Positive for activity change. Respiratory:          Ex smoker   Cardiovascular:         HTN   Endocrine:        Thyroid  nodule   Genitourinary:         CKD stage 3   Musculoskeletal:  Positive for arthralgias, back pain, gait problem, myalgias, neck pain and neck stiffness. Psychiatric/Behavioral:  The patient is nervous/anxious. Objective:     Vitals:    08/02/22 0954   BP: 128/80   Site: Left Upper Arm   Position: Sitting   Cuff Size: Medium Adult   Pulse: 80   Weight: 155 lb (70.3 kg)   Height: 5' 5\" (1.651 m)       Physical Exam  Vitals and nursing note reviewed. Constitutional:       General: She is not in acute distress. Appearance: She is well-developed. She is not diaphoretic. HENT:      Head: Normocephalic and atraumatic. Right Ear: External ear normal.      Left Ear: External ear normal.      Nose: Nose normal.      Mouth/Throat:      Pharynx: No oropharyngeal exudate. Eyes:      General: No scleral icterus. Right eye: No discharge. Left eye: No discharge. Conjunctiva/sclera: Conjunctivae normal.      Pupils: Pupils are equal, round, and reactive to light. Neck:      Thyroid: No thyromegaly. Cardiovascular:      Rate and Rhythm: Normal rate and regular rhythm. Heart sounds: Normal heart sounds. No murmur heard. No friction rub. No gallop. Pulmonary:      Effort: Pulmonary effort is normal. No respiratory distress. Breath sounds: Normal breath sounds. No wheezing or rales. Chest:      Chest wall: No tenderness.    Abdominal:      General: Bowel sounds are agitated, slowed, aggressive, withdrawn, hyperactive or combative. Thought Content: Thought content is not paranoid or delusional. Thought content does not include homicidal or suicidal ideation. Thought content does not include homicidal or suicidal plan. Cognition and Memory: Memory is not impaired. She does not exhibit impaired recent memory or impaired remote memory. Judgment: Judgment is not impulsive or inappropriate. MARC  Patricks test  positive  Yeoman's  or Gaenslen's positive  Kemps  positive  Spurlings  positive  Beck's na         Assessment:     1. Spondylosis of lumbar region without myelopathy or radiculopathy    2. Spinal stenosis of lumbar region without neurogenic claudication    3. DDD (degenerative disc disease), cervical    4. DDD (degenerative disc disease), lumbar    5. DDD (degenerative disc disease), thoracic    6. Cervical spondylosis with radiculopathy    7. Pain in both hands    8. Bilateral hip pain    9. Lumbar radiculitis    10. Shoulder arthritis            Plan:      Testing Labs or Radiology reviewed: Lumbar  Cervical MRI hand Xrs  Procedures:discussed  lumbar cervical Epidurals and Facet MBB injections SI injections   Discussed with patient about risks with procedure including infection, reaction to medication, increased pain, or bleeding. Medications:NSAIDS  Tumeric  Glucosamine Tylenol   Discussed hand wrist  or podiatry referrals  Discussed Neuro Spine referrals       Meds. Prescribed:   No orders of the defined types were placed in this encounter. Return for pt will call.                Electronically signed by INES Gibson CNP on8/2/2022 at 10:36 AM

## 2022-08-02 ENCOUNTER — OFFICE VISIT (OUTPATIENT)
Dept: PHYSICAL MEDICINE AND REHAB | Age: 77
End: 2022-08-02
Payer: MEDICARE

## 2022-08-02 VITALS
DIASTOLIC BLOOD PRESSURE: 80 MMHG | WEIGHT: 155 LBS | HEART RATE: 80 BPM | SYSTOLIC BLOOD PRESSURE: 128 MMHG | BODY MASS INDEX: 25.83 KG/M2 | HEIGHT: 65 IN

## 2022-08-02 DIAGNOSIS — M79.641 PAIN IN BOTH HANDS: ICD-10-CM

## 2022-08-02 DIAGNOSIS — M51.34 DDD (DEGENERATIVE DISC DISEASE), THORACIC: ICD-10-CM

## 2022-08-02 DIAGNOSIS — M51.36 DDD (DEGENERATIVE DISC DISEASE), LUMBAR: ICD-10-CM

## 2022-08-02 DIAGNOSIS — M47.816 SPONDYLOSIS OF LUMBAR REGION WITHOUT MYELOPATHY OR RADICULOPATHY: Primary | ICD-10-CM

## 2022-08-02 DIAGNOSIS — M54.16 LUMBAR RADICULITIS: ICD-10-CM

## 2022-08-02 DIAGNOSIS — M25.552 BILATERAL HIP PAIN: ICD-10-CM

## 2022-08-02 DIAGNOSIS — M19.019 SHOULDER ARTHRITIS: ICD-10-CM

## 2022-08-02 DIAGNOSIS — M50.30 DDD (DEGENERATIVE DISC DISEASE), CERVICAL: ICD-10-CM

## 2022-08-02 DIAGNOSIS — M47.22 CERVICAL SPONDYLOSIS WITH RADICULOPATHY: ICD-10-CM

## 2022-08-02 DIAGNOSIS — M79.642 PAIN IN BOTH HANDS: ICD-10-CM

## 2022-08-02 DIAGNOSIS — M48.061 SPINAL STENOSIS OF LUMBAR REGION WITHOUT NEUROGENIC CLAUDICATION: ICD-10-CM

## 2022-08-02 DIAGNOSIS — M25.551 BILATERAL HIP PAIN: ICD-10-CM

## 2022-08-02 PROCEDURE — 1123F ACP DISCUSS/DSCN MKR DOCD: CPT | Performed by: NURSE PRACTITIONER

## 2022-08-02 PROCEDURE — 99215 OFFICE O/P EST HI 40 MIN: CPT | Performed by: NURSE PRACTITIONER

## 2022-08-02 ASSESSMENT — ENCOUNTER SYMPTOMS: BACK PAIN: 1

## 2022-08-02 NOTE — PROGRESS NOTES
[address] [facility] [address]  Dept: [phone]  Dept Fax: [phone]: [phone]    Visit Date: 8/2/2022    Functionality Assessment/Goals Worksheet     On a scale of 0 (Does not Interfere) to 10 (Completely Interferes)     1. Which number describes how during the past week pain has interfered with       the following:  A. General Activity:  5  B. Mood: 5  C. Walking Ability:  5  D. Normal Work (Includes both work outside the home and housework):  5  E. Relations with Other People:   0  F. Sleep:   7  G. Enjoyment of Life:   5    2. Patient Prefers to Take their Pain Medications:     []  On a regular basis   [x]  Only when necessary    []  Does not take pain medications    3. What are the Patient's Goals/Expectations for Visiting Pain Management?      [x]  Learn about my pain    [x]  Receive Medication   []  Physical Therapy     []  Treat Depression   [x]  Receive Injections    []  Treat Sleep   []  Deal with Anxiety and Stress   []  Treat Opoid Dependence/Addiction   []  Other:

## 2022-12-10 LAB
CHOLESTEROL/HDL RATIO: 4.4 RATIO
CHOLESTEROL: 253 MG/DL
HDLC SERPL-MCNC: 57 MG/DL
LDL CHOLESTEROL CALCULATED: 165 MG/DL
LDL/HDL RATIO: 2.9 RATIO
TRIGL SERPL-MCNC: 156 MG/DL
VLDLC SERPL CALC-MCNC: 31 MG/DL

## 2022-12-15 ENCOUNTER — OFFICE VISIT (OUTPATIENT)
Dept: FAMILY MEDICINE CLINIC | Age: 77
End: 2022-12-15

## 2022-12-15 VITALS
RESPIRATION RATE: 16 BRPM | BODY MASS INDEX: 25.46 KG/M2 | WEIGHT: 153 LBS | SYSTOLIC BLOOD PRESSURE: 130 MMHG | DIASTOLIC BLOOD PRESSURE: 78 MMHG | HEART RATE: 72 BPM

## 2022-12-15 DIAGNOSIS — E78.5 HYPERLIPIDEMIA, UNSPECIFIED HYPERLIPIDEMIA TYPE: ICD-10-CM

## 2022-12-15 DIAGNOSIS — E04.1 THYROID NODULE: Primary | ICD-10-CM

## 2022-12-15 ASSESSMENT — ENCOUNTER SYMPTOMS
RHINORRHEA: 0
NAUSEA: 0
COUGH: 0
DIARRHEA: 0
SHORTNESS OF BREATH: 0
ABDOMINAL PAIN: 0
ABDOMINAL DISTENTION: 0
BLOOD IN STOOL: 0
EYE DISCHARGE: 0
CONSTIPATION: 0
COLOR CHANGE: 0
SORE THROAT: 0
EYE REDNESS: 0
ANAL BLEEDING: 0

## 2022-12-15 NOTE — PATIENT INSTRUCTIONS
Plan:   Repeat thyroid ultrasound  Pt states if she needs fine needle aspiration she will not do it until Jan or Feb  Repeat labs  Cholesterol labs in 3 months  Increase aerobic exercise to 30-40 minutes 3-4 times per week  Increase vegetables, fruits, poultry, fish, and low-fat dairy products  Avoid greasy, fatty, fried foods, sweets, sugar-sweetened beverages, and red meats  Reduce sodium in the diet  Will give info on the DASH diet    Will add a Fish Oil -  four times daily with meals, if you are eating fish for a meal you don't have to take a fish oil  [employer] brand - Pharmaceutical grade 1,000 mg (item # [id number])    Increase the amount of water you drink daily - half of your body weight in ounces (ex: 100 pound person = 50 oz water/day  Increase you exercise: 30 minutes daily in addition to your daily activity.  Can walk, run, job, swim, or weight train  Limit the amount of fats, carbohydrates, and sugars you consume

## 2022-12-15 NOTE — PROGRESS NOTES
[facility]  [address]  [facility] [address]  Dept: [phone]  Loc: [phone]    Visit Date: 12/15/2022    Alina Love is a 68 y.o. female who presents today for:  Chief Complaint   Patient presents with    6 Month Follow-Up     Pt was never contacted to schedule the thyroid nodule fine needle aspiration. Pt would also like to discuss her medication as well. Pt would like to hold off on starting a Statin medication at this time. Discuss Labs     Completed 12/9/22. HPI:     Recent labs show cholesterol was elevated: Total 253    Triglycerides 156    Was on Lipitor in 2012 - that's when she started noticing her aches and pains starting. Does not want to start meds right now    Had thyroid ultrasound done in June '22 - fine needle aspiration was ordered - never done - states she was never called to schedule it.       HPI  Health Maintenance   Topic Date Due    Annual Wellness Visit (AWV)  Never done    DTaP/Tdap/Td vaccine (2 - Td or Tdap) 10/16/2022    Low dose CT lung screening  05/10/2023    Depression Screen  06/14/2023    DEXA (modify frequency per FRAX score)  Completed    Flu vaccine  Completed    Shingles vaccine  Completed    Pneumococcal 65+ years Vaccine  Completed    COVID-19 Vaccine  Completed    Hepatitis C screen  Completed    Hepatitis A vaccine  Aged Out    Hib vaccine  Aged Out    Meningococcal (ACWY) vaccine  Aged Out     Past Medical History:   Diagnosis Date    Bipolar 1 disorder, depressed, full remission ([address]     Chronic renal disease, stage III ([address] [588658] 6/14/2022    Degenerative arthritis of lumbar spine 12/08    Essential hypertension 5/29/2018    Hyperglycemia     hx of    Hyperlipidemia     Migraine     Osteopenia 12/08    Smoker     Thyromegaly     hx of      Past Surgical History:   Procedure Laterality Date    CARPAL TUNNEL RELEASE Right 09/20/2018    COLONOSCOPY  03/08    CYST REMOVAL      head DILATION AND CURETTAGE OF UTERUS      TOOTH EXTRACTION N/A 2019     Family History   Problem Relation Age of Onset    Thyroid Disease Mother     High Cholesterol Mother     Stroke Father     High Blood Pressure Brother     Stroke Brother      Social History     Tobacco Use    Smoking status: Former     Packs/day: 1.00     Years: 40.00     Pack years: 40.00     Types: Cigarettes     Quit date:      Years since quittin.9    Smokeless tobacco: Never   Substance Use Topics    Alcohol use: No      Current Outpatient Medications   Medication Sig Dispense Refill    Omega-3 Fatty Acids (FISH OIL PO) Take by mouth      Cholecalciferol (VITAMIN D3) 50 MCG (2000 UT) TABS Take one tablet daily 30 tablet 5    Multiple Vitamins-Minerals (HAIR SKIN AND NAILS FORMULA) TABS Take 1 tablet by mouth daily      Misc Natural Products (JOINT HEALTH) CAPS Take 1 tablet by mouth daily      acetaminophen (TYLENOL) 500 MG tablet Take 500 mg by mouth in the morning and at bedtime      Turmeric (QC TUMERIC COMPLEX PO) Take 1 tablet by mouth daily      B-Complex-C TABS Take 1 tablet by mouth daily      magnesium oxide (MAG-OX) 400 MG tablet Take 400 mg by mouth daily      calcium carbonate (OSCAL) 500 MG TABS tablet Take 500 mg by mouth daily      ibuprofen (ADVIL;MOTRIN) 200 MG tablet Take 200 mg by mouth every morning       cetirizine (ZYRTEC) 10 MG tablet Take 10 mg by mouth daily       No current facility-administered medications for this visit. Allergies   Allergen Reactions    Sulfa Antibiotics        Subjective:    Review of Systems   Constitutional:  Negative for chills, fatigue and fever. HENT:  Negative for congestion, ear pain, postnasal drip, rhinorrhea and sore throat. Eyes:  Negative for discharge and redness. Respiratory:  Negative for cough and shortness of breath. Cardiovascular:  Negative for chest pain and leg swelling.    Gastrointestinal:  Negative for abdominal distention, abdominal pain, anal bleeding, blood in stool, constipation, diarrhea and nausea. Skin:  Negative for color change and rash. Neurological:  Negative for facial asymmetry, speech difficulty and weakness. Hematological:  Does not bruise/bleed easily. Psychiatric/Behavioral:  Negative for agitation and confusion. Objective:     Vitals:    12/15/22 1024   BP: 130/78   Pulse: 72   Resp: 16   Weight: 153 lb (69.4 kg)       Body mass index is 25.46 kg/m². @STYMNMQ(0)@  BP Readings from Last 3 Encounters:   12/15/22 130/78   08/02/22 128/80   06/21/22 (!) 140/80     Physical Exam  Constitutional:       General: She is not in acute distress. Appearance: Normal appearance. She is well-developed. She is not ill-appearing or diaphoretic. HENT:      Head: Normocephalic and atraumatic. Right Ear: Hearing and external ear normal. No decreased hearing noted. Left Ear: Hearing and external ear normal. No decreased hearing noted. Nose: Nose normal. No nasal deformity. Eyes:      General:         Right eye: No discharge. Left eye: No discharge. Conjunctiva/sclera: Conjunctivae normal.   Pulmonary:      Effort: Pulmonary effort is normal. No respiratory distress. Abdominal:      General: There is no distension. Tenderness: There is no guarding. Musculoskeletal:         General: No tenderness or deformity. Normal range of motion. Cervical back: Normal range of motion and neck supple. Skin:     Coloration: Skin is not pale. Findings: No erythema or rash (On exposed areas). Neurological:      General: No focal deficit present. Mental Status: She is alert and oriented to person, place, and time. Gait: Gait normal.   Psychiatric:         Mood and Affect: Mood normal.         Speech: Speech normal.         Behavior: Behavior normal.         Thought Content:  Thought content normal.         Judgment: Judgment normal.       Lab Results   Component Value Date    WBC 8.4 05/13/2022 HGB 12.2 05/13/2022    HCT 36.4 05/13/2022     05/13/2022    CHOL 253 (H) 12/09/2022    TRIG 156 (H) 12/09/2022    HDL 57 12/09/2022    LDLCALC 165 (H) 12/09/2022    AST 20 05/13/2022     05/13/2022    K 4.7 05/13/2022     05/13/2022    CREATININE 0.98 05/13/2022    BUN 20 05/13/2022    CO2 27 05/13/2022    TSH 5.17 (H) 05/13/2022    MG 2.1 05/13/2022    CALCIUM 10.0 05/13/2022    VITD25 31.2 05/13/2022     Assessment:    Atul Calvert was seen today for 6 month follow-up and discuss labs. Diagnoses and all orders for this visit:    Thyroid nodule  -     US THYROID; Future  -     TSH with Reflex; Future  -     T4; Future  -     Thyroid Peroxidase Antibody; Future    Hyperlipidemia, unspecified hyperlipidemia type  -     Lipid Panel; Future    Plan:   Repeat thyroid ultrasound  Pt states if she needs fine needle aspiration she will not do it until Jan or Feb  Repeat labs  Cholesterol labs in 3 months  Increase aerobic exercise to 30-40 minutes 3-4 times per week  Increase vegetables, fruits, poultry, fish, and low-fat dairy products  Avoid greasy, fatty, fried foods, sweets, sugar-sweetened beverages, and red meats  Reduce sodium in the diet  Will give info on the DASH diet    Will add a Fish Oil -  four times daily with meals, if you are eating fish for a meal you don't have to take a fish oil  [employer] brand - Pharmaceutical grade 1,000 mg (item # [id number])    Increase the amount of water you drink daily - half of your body weight in ounces (ex: 100 pound person = 50 oz water/day  Increase you exercise: 30 minutes daily in addition to your daily activity. Can walk, run, job, swim, or weight train  Limit the amount of fats, carbohydrates, and sugars you consume     Return in about 6 months (around 6/15/2023), or if symptoms worsen or fail to improve.     Orders Placed:  Orders Placed This Encounter   Procedures    US THYROID    Lipid Panel    TSH with Reflex    T4    Thyroid Peroxidase Antibody Medications Prescribed:  No orders of the defined types were placed in this encounter. No future appointments. Patient given educational materials - see patient instructions. Discussed use, benefit, and side effects of prescribedmedications. All patient questions answered. Pt voiced understanding. Reviewed health maintenance. Instructed to continue current medications, diet and exercise. Patient agreed with treatment plan. Follow up as directed.     Electronically signed by INES Francisco CNP on 12/15/2022 at 10:52 AM

## 2022-12-16 LAB
T4 TOTAL: 5.6 UG/DL (ref 4.5–10.5)
TSH SERPL DL<=0.05 MIU/L-ACNC: 3.71 UIU/ML (ref 0.4–4.1)

## 2022-12-17 LAB — THYROID PEROXIDASE ANTIBODY: 1 IU/ML

## 2022-12-28 ENCOUNTER — HOSPITAL ENCOUNTER (OUTPATIENT)
Dept: ULTRASOUND IMAGING | Age: 77
Discharge: HOME | End: 2022-12-28
Payer: MEDICARE

## 2022-12-28 DIAGNOSIS — E04.1 THYROID NODULE: ICD-10-CM

## 2022-12-28 PROCEDURE — 76536 US EXAM OF HEAD AND NECK: CPT

## 2022-12-29 ENCOUNTER — TELEPHONE (OUTPATIENT)
Dept: FAMILY MEDICINE CLINIC | Age: 77
End: 2022-12-29

## 2022-12-29 NOTE — TELEPHONE ENCOUNTER
----- Message from INES Vargas CNP sent at 12/28/2022  4:20 PM EST -----  US shows stable 3.9#3.4#1.9 cm nodule. Ultrasound guided aspirations is recommended. Please see if patient would like to proceed.  TS

## 2022-12-29 NOTE — TELEPHONE ENCOUNTER
Pt notified. She wants to think about getting the needle aspiration and talk to her insurance before deciding. Pt will call if she wants to continue.

## 2023-02-01 ENCOUNTER — TELEPHONE (OUTPATIENT)
Dept: FAMILY MEDICINE CLINIC | Age: 78
End: 2023-02-01

## 2023-02-01 DIAGNOSIS — E04.1 RIGHT THYROID NODULE: Primary | ICD-10-CM

## 2023-02-01 NOTE — TELEPHONE ENCOUNTER
Pt called stating that she had a thyroid ultrasound done on 12/28/22 and a FNA was recommended. She initially declined wanting to have this done but has decided to go ahead with the procedure. Would like it done at [facility]. Order faxed to [facility] at [phone] and they will contact the pt to schedule. Pt knows they will be contacting her.

## 2023-02-24 ENCOUNTER — HOSPITAL ENCOUNTER (OUTPATIENT)
Dept: ULTRASOUND IMAGING | Age: 78
Discharge: HOME | End: 2023-02-24
Payer: MEDICARE

## 2023-02-24 DIAGNOSIS — E04.1 RIGHT THYROID NODULE: ICD-10-CM

## 2023-02-24 PROCEDURE — 60300 ASPIR/INJ THYROID CYST: CPT

## 2023-03-29 LAB
CHOLESTEROL/HDL RATIO: 4.6 RATIO
CHOLESTEROL: 251 MG/DL
HDLC SERPL-MCNC: 54 MG/DL
LDL CHOLESTEROL CALCULATED: 167 MG/DL
LDL/HDL RATIO: 3.1 RATIO
TRIGL SERPL-MCNC: 148 MG/DL
VLDLC SERPL CALC-MCNC: 30 MG/DL

## 2023-03-30 ENCOUNTER — TELEPHONE (OUTPATIENT)
Dept: FAMILY MEDICINE CLINIC | Age: 78
End: 2023-03-30

## 2023-03-30 NOTE — TELEPHONE ENCOUNTER
----- Message from INES Abreu - CNP sent at 3/30/2023  9:45 AM EDT -----  Cholesterol does not show much improvement from last check. If she is willing, I would recommend a cholesterol medication. We could try a different cholesterol medication, one that has a lower risk of causing muscle aches.     If agreeable can send in pravastatin 20 mg once daily #30 with 3 refills- recheck lipid panel and hepatic panel in 3 months DX mixed hyperlipidemia

## 2023-03-30 NOTE — TELEPHONE ENCOUNTER
LM letting pt know I was calling to go over her recent labs. I asked she call the office to go over the results together. Will await call back.

## 2023-04-05 NOTE — TELEPHONE ENCOUNTER
Pt notified and states that she will wait and discuss this in person at her appt with Linda Whiting on 6-15-23